# Patient Record
Sex: MALE | Race: OTHER | NOT HISPANIC OR LATINO | URBAN - METROPOLITAN AREA
[De-identification: names, ages, dates, MRNs, and addresses within clinical notes are randomized per-mention and may not be internally consistent; named-entity substitution may affect disease eponyms.]

---

## 2021-01-01 ENCOUNTER — INPATIENT (INPATIENT)
Facility: HOSPITAL | Age: 81
LOS: 6 days | DRG: 308 | End: 2021-02-05
Attending: STUDENT IN AN ORGANIZED HEALTH CARE EDUCATION/TRAINING PROGRAM | Admitting: STUDENT IN AN ORGANIZED HEALTH CARE EDUCATION/TRAINING PROGRAM
Payer: MEDICARE

## 2021-01-01 VITALS
SYSTOLIC BLOOD PRESSURE: 91 MMHG | OXYGEN SATURATION: 98 % | WEIGHT: 175.05 LBS | TEMPERATURE: 98 F | RESPIRATION RATE: 20 BRPM | DIASTOLIC BLOOD PRESSURE: 58 MMHG | HEART RATE: 124 BPM

## 2021-01-01 VITALS
HEART RATE: 62 BPM | OXYGEN SATURATION: 95 % | RESPIRATION RATE: 20 BRPM | TEMPERATURE: 98 F | DIASTOLIC BLOOD PRESSURE: 73 MMHG | SYSTOLIC BLOOD PRESSURE: 126 MMHG

## 2021-01-01 DIAGNOSIS — I48.91 UNSPECIFIED ATRIAL FIBRILLATION: ICD-10-CM

## 2021-01-01 DIAGNOSIS — F03.90 UNSPECIFIED DEMENTIA WITHOUT BEHAVIORAL DISTURBANCE: ICD-10-CM

## 2021-01-01 DIAGNOSIS — R50.9 FEVER, UNSPECIFIED: ICD-10-CM

## 2021-01-01 DIAGNOSIS — Z29.9 ENCOUNTER FOR PROPHYLACTIC MEASURES, UNSPECIFIED: ICD-10-CM

## 2021-01-01 DIAGNOSIS — R53.81 OTHER MALAISE: ICD-10-CM

## 2021-01-01 DIAGNOSIS — Z51.5 ENCOUNTER FOR PALLIATIVE CARE: ICD-10-CM

## 2021-01-01 DIAGNOSIS — Z71.89 OTHER SPECIFIED COUNSELING: ICD-10-CM

## 2021-01-01 DIAGNOSIS — N17.9 ACUTE KIDNEY FAILURE, UNSPECIFIED: ICD-10-CM

## 2021-01-01 DIAGNOSIS — I95.89 OTHER HYPOTENSION: ICD-10-CM

## 2021-01-01 DIAGNOSIS — U07.1 COVID-19: ICD-10-CM

## 2021-01-01 DIAGNOSIS — D64.9 ANEMIA, UNSPECIFIED: ICD-10-CM

## 2021-01-01 DIAGNOSIS — R41.0 DISORIENTATION, UNSPECIFIED: ICD-10-CM

## 2021-01-01 DIAGNOSIS — E43 UNSPECIFIED SEVERE PROTEIN-CALORIE MALNUTRITION: ICD-10-CM

## 2021-01-01 DIAGNOSIS — C67.9 MALIGNANT NEOPLASM OF BLADDER, UNSPECIFIED: ICD-10-CM

## 2021-01-01 LAB
-  AMPICILLIN: SIGNIFICANT CHANGE UP
-  CIPROFLOXACIN: SIGNIFICANT CHANGE UP
-  DAPTOMYCIN: SIGNIFICANT CHANGE UP
-  LEVOFLOXACIN: SIGNIFICANT CHANGE UP
-  LINEZOLID: SIGNIFICANT CHANGE UP
-  NITROFURANTOIN: SIGNIFICANT CHANGE UP
-  TETRACYCLINE: SIGNIFICANT CHANGE UP
-  VANCOMYCIN: SIGNIFICANT CHANGE UP
ALBUMIN SERPL ELPH-MCNC: 1.7 G/DL — LOW (ref 3.5–5)
ALBUMIN SERPL ELPH-MCNC: 1.7 G/DL — LOW (ref 3.5–5)
ALBUMIN SERPL ELPH-MCNC: 1.8 G/DL — LOW (ref 3.5–5)
ALBUMIN SERPL ELPH-MCNC: 1.9 G/DL — LOW (ref 3.5–5)
ALBUMIN SERPL ELPH-MCNC: 1.9 G/DL — LOW (ref 3.5–5)
ALBUMIN SERPL ELPH-MCNC: 2 G/DL — LOW (ref 3.5–5)
ALBUMIN SERPL ELPH-MCNC: 2.1 G/DL — LOW (ref 3.5–5)
ALBUMIN SERPL ELPH-MCNC: 2.2 G/DL — LOW (ref 3.5–5)
ALP SERPL-CCNC: 102 U/L — SIGNIFICANT CHANGE UP (ref 40–120)
ALP SERPL-CCNC: 106 U/L — SIGNIFICANT CHANGE UP (ref 40–120)
ALP SERPL-CCNC: 108 U/L — SIGNIFICANT CHANGE UP (ref 40–120)
ALP SERPL-CCNC: 113 U/L — SIGNIFICANT CHANGE UP (ref 40–120)
ALP SERPL-CCNC: 115 U/L — SIGNIFICANT CHANGE UP (ref 40–120)
ALP SERPL-CCNC: 90 U/L — SIGNIFICANT CHANGE UP (ref 40–120)
ALP SERPL-CCNC: 94 U/L — SIGNIFICANT CHANGE UP (ref 40–120)
ALP SERPL-CCNC: 97 U/L — SIGNIFICANT CHANGE UP (ref 40–120)
ALT FLD-CCNC: 11 U/L DA — SIGNIFICANT CHANGE UP (ref 10–60)
ALT FLD-CCNC: 12 U/L DA — SIGNIFICANT CHANGE UP (ref 10–60)
ALT FLD-CCNC: 14 U/L DA — SIGNIFICANT CHANGE UP (ref 10–60)
ALT FLD-CCNC: 16 U/L DA — SIGNIFICANT CHANGE UP (ref 10–60)
ALT FLD-CCNC: 17 U/L DA — SIGNIFICANT CHANGE UP (ref 10–60)
ALT FLD-CCNC: 20 U/L DA — SIGNIFICANT CHANGE UP (ref 10–60)
ALT FLD-CCNC: 22 U/L DA — SIGNIFICANT CHANGE UP (ref 10–60)
ALT FLD-CCNC: 23 U/L DA — SIGNIFICANT CHANGE UP (ref 10–60)
ANION GAP SERPL CALC-SCNC: 10 MMOL/L — SIGNIFICANT CHANGE UP (ref 5–17)
ANION GAP SERPL CALC-SCNC: 5 MMOL/L — SIGNIFICANT CHANGE UP (ref 5–17)
ANION GAP SERPL CALC-SCNC: 5 MMOL/L — SIGNIFICANT CHANGE UP (ref 5–17)
ANION GAP SERPL CALC-SCNC: 6 MMOL/L — SIGNIFICANT CHANGE UP (ref 5–17)
ANION GAP SERPL CALC-SCNC: 7 MMOL/L — SIGNIFICANT CHANGE UP (ref 5–17)
ANION GAP SERPL CALC-SCNC: 7 MMOL/L — SIGNIFICANT CHANGE UP (ref 5–17)
ANION GAP SERPL CALC-SCNC: 8 MMOL/L — SIGNIFICANT CHANGE UP (ref 5–17)
ANION GAP SERPL CALC-SCNC: 8 MMOL/L — SIGNIFICANT CHANGE UP (ref 5–17)
ANISOCYTOSIS BLD QL: SIGNIFICANT CHANGE UP
APPEARANCE UR: ABNORMAL
APTT BLD: 29 SEC — SIGNIFICANT CHANGE UP (ref 27.5–35.5)
APTT BLD: 30.7 SEC — SIGNIFICANT CHANGE UP (ref 27.5–35.5)
AST SERPL-CCNC: 12 U/L — SIGNIFICANT CHANGE UP (ref 10–40)
AST SERPL-CCNC: 13 U/L — SIGNIFICANT CHANGE UP (ref 10–40)
AST SERPL-CCNC: 14 U/L — SIGNIFICANT CHANGE UP (ref 10–40)
AST SERPL-CCNC: 15 U/L — SIGNIFICANT CHANGE UP (ref 10–40)
AST SERPL-CCNC: 15 U/L — SIGNIFICANT CHANGE UP (ref 10–40)
AST SERPL-CCNC: 21 U/L — SIGNIFICANT CHANGE UP (ref 10–40)
AST SERPL-CCNC: 22 U/L — SIGNIFICANT CHANGE UP (ref 10–40)
AST SERPL-CCNC: 23 U/L — SIGNIFICANT CHANGE UP (ref 10–40)
BASOPHILS # BLD AUTO: 0 K/UL — SIGNIFICANT CHANGE UP (ref 0–0.2)
BASOPHILS # BLD AUTO: 0.02 K/UL — SIGNIFICANT CHANGE UP (ref 0–0.2)
BASOPHILS # BLD AUTO: 0.02 K/UL — SIGNIFICANT CHANGE UP (ref 0–0.2)
BASOPHILS # BLD AUTO: 0.03 K/UL — SIGNIFICANT CHANGE UP (ref 0–0.2)
BASOPHILS NFR BLD AUTO: 0 % — SIGNIFICANT CHANGE UP (ref 0–2)
BASOPHILS NFR BLD AUTO: 0.2 % — SIGNIFICANT CHANGE UP (ref 0–2)
BASOPHILS NFR BLD AUTO: 0.3 % — SIGNIFICANT CHANGE UP (ref 0–2)
BASOPHILS NFR BLD AUTO: 0.3 % — SIGNIFICANT CHANGE UP (ref 0–2)
BILIRUB SERPL-MCNC: 0.4 MG/DL — SIGNIFICANT CHANGE UP (ref 0.2–1.2)
BILIRUB SERPL-MCNC: 0.5 MG/DL — SIGNIFICANT CHANGE UP (ref 0.2–1.2)
BILIRUB UR-MCNC: NEGATIVE — SIGNIFICANT CHANGE UP
BLD GP AB SCN SERPL QL: SIGNIFICANT CHANGE UP
BUN SERPL-MCNC: 24 MG/DL — HIGH (ref 7–18)
BUN SERPL-MCNC: 25 MG/DL — HIGH (ref 7–18)
BUN SERPL-MCNC: 28 MG/DL — HIGH (ref 7–18)
BUN SERPL-MCNC: 30 MG/DL — HIGH (ref 7–18)
BUN SERPL-MCNC: 31 MG/DL — HIGH (ref 7–18)
BUN SERPL-MCNC: 32 MG/DL — HIGH (ref 7–18)
BUN SERPL-MCNC: 32 MG/DL — HIGH (ref 7–18)
BUN SERPL-MCNC: 36 MG/DL — HIGH (ref 7–18)
CALCIUM SERPL-MCNC: 8.1 MG/DL — LOW (ref 8.4–10.5)
CALCIUM SERPL-MCNC: 8.3 MG/DL — LOW (ref 8.4–10.5)
CALCIUM SERPL-MCNC: 8.6 MG/DL — SIGNIFICANT CHANGE UP (ref 8.4–10.5)
CALCIUM SERPL-MCNC: 8.8 MG/DL — SIGNIFICANT CHANGE UP (ref 8.4–10.5)
CALCIUM SERPL-MCNC: 8.9 MG/DL — SIGNIFICANT CHANGE UP (ref 8.4–10.5)
CALCIUM SERPL-MCNC: 9.1 MG/DL — SIGNIFICANT CHANGE UP (ref 8.4–10.5)
CHLORIDE SERPL-SCNC: 103 MMOL/L — SIGNIFICANT CHANGE UP (ref 96–108)
CHLORIDE SERPL-SCNC: 103 MMOL/L — SIGNIFICANT CHANGE UP (ref 96–108)
CHLORIDE SERPL-SCNC: 104 MMOL/L — SIGNIFICANT CHANGE UP (ref 96–108)
CHLORIDE SERPL-SCNC: 106 MMOL/L — SIGNIFICANT CHANGE UP (ref 96–108)
CHLORIDE SERPL-SCNC: 107 MMOL/L — SIGNIFICANT CHANGE UP (ref 96–108)
CHLORIDE SERPL-SCNC: 109 MMOL/L — HIGH (ref 96–108)
CO2 SERPL-SCNC: 23 MMOL/L — SIGNIFICANT CHANGE UP (ref 22–31)
CO2 SERPL-SCNC: 26 MMOL/L — SIGNIFICANT CHANGE UP (ref 22–31)
CO2 SERPL-SCNC: 27 MMOL/L — SIGNIFICANT CHANGE UP (ref 22–31)
CO2 SERPL-SCNC: 28 MMOL/L — SIGNIFICANT CHANGE UP (ref 22–31)
CO2 SERPL-SCNC: 28 MMOL/L — SIGNIFICANT CHANGE UP (ref 22–31)
CO2 SERPL-SCNC: 29 MMOL/L — SIGNIFICANT CHANGE UP (ref 22–31)
COLOR SPEC: YELLOW — SIGNIFICANT CHANGE UP
CREAT SERPL-MCNC: 0.95 MG/DL — SIGNIFICANT CHANGE UP (ref 0.5–1.3)
CREAT SERPL-MCNC: 0.96 MG/DL — SIGNIFICANT CHANGE UP (ref 0.5–1.3)
CREAT SERPL-MCNC: 0.99 MG/DL — SIGNIFICANT CHANGE UP (ref 0.5–1.3)
CREAT SERPL-MCNC: 1.18 MG/DL — SIGNIFICANT CHANGE UP (ref 0.5–1.3)
CREAT SERPL-MCNC: 1.2 MG/DL — SIGNIFICANT CHANGE UP (ref 0.5–1.3)
CREAT SERPL-MCNC: 1.36 MG/DL — HIGH (ref 0.5–1.3)
CREAT SERPL-MCNC: 1.38 MG/DL — HIGH (ref 0.5–1.3)
CREAT SERPL-MCNC: 1.43 MG/DL — HIGH (ref 0.5–1.3)
CULTURE RESULTS: SIGNIFICANT CHANGE UP
D DIMER BLD IA.RAPID-MCNC: 497 NG/ML DDU — HIGH
DIFF PNL FLD: ABNORMAL
EOSINOPHIL # BLD AUTO: 0.04 K/UL — SIGNIFICANT CHANGE UP (ref 0–0.5)
EOSINOPHIL # BLD AUTO: 0.16 K/UL — SIGNIFICANT CHANGE UP (ref 0–0.5)
EOSINOPHIL # BLD AUTO: 0.16 K/UL — SIGNIFICANT CHANGE UP (ref 0–0.5)
EOSINOPHIL # BLD AUTO: 0.17 K/UL — SIGNIFICANT CHANGE UP (ref 0–0.5)
EOSINOPHIL NFR BLD AUTO: 0.4 % — SIGNIFICANT CHANGE UP (ref 0–6)
EOSINOPHIL NFR BLD AUTO: 1.9 % — SIGNIFICANT CHANGE UP (ref 0–6)
EOSINOPHIL NFR BLD AUTO: 2 % — SIGNIFICANT CHANGE UP (ref 0–6)
EOSINOPHIL NFR BLD AUTO: 2.3 % — SIGNIFICANT CHANGE UP (ref 0–6)
FERRITIN SERPL-MCNC: 269 NG/ML — SIGNIFICANT CHANGE UP (ref 30–400)
FIBRINOGEN PPP-MCNC: 980 MG/DL — HIGH (ref 290–520)
GLUCOSE BLDC GLUCOMTR-MCNC: 165 MG/DL — HIGH (ref 70–99)
GLUCOSE BLDC GLUCOMTR-MCNC: 178 MG/DL — HIGH (ref 70–99)
GLUCOSE SERPL-MCNC: 100 MG/DL — HIGH (ref 70–99)
GLUCOSE SERPL-MCNC: 111 MG/DL — HIGH (ref 70–99)
GLUCOSE SERPL-MCNC: 116 MG/DL — HIGH (ref 70–99)
GLUCOSE SERPL-MCNC: 120 MG/DL — HIGH (ref 70–99)
GLUCOSE SERPL-MCNC: 131 MG/DL — HIGH (ref 70–99)
GLUCOSE SERPL-MCNC: 77 MG/DL — SIGNIFICANT CHANGE UP (ref 70–99)
GLUCOSE SERPL-MCNC: 90 MG/DL — SIGNIFICANT CHANGE UP (ref 70–99)
GLUCOSE SERPL-MCNC: 93 MG/DL — SIGNIFICANT CHANGE UP (ref 70–99)
GLUCOSE UR QL: NEGATIVE — SIGNIFICANT CHANGE UP
HCT VFR BLD CALC: 24.9 % — LOW (ref 39–50)
HCT VFR BLD CALC: 25.2 % — LOW (ref 39–50)
HCT VFR BLD CALC: 25.6 % — LOW (ref 39–50)
HCT VFR BLD CALC: 25.8 % — LOW (ref 39–50)
HCT VFR BLD CALC: 26.9 % — LOW (ref 39–50)
HCT VFR BLD CALC: 27.2 % — LOW (ref 39–50)
HCT VFR BLD CALC: 27.7 % — LOW (ref 39–50)
HCT VFR BLD CALC: 28.7 % — LOW (ref 39–50)
HCT VFR BLD CALC: 29.7 % — LOW (ref 39–50)
HGB BLD-MCNC: 7.2 G/DL — LOW (ref 13–17)
HGB BLD-MCNC: 7.3 G/DL — LOW (ref 13–17)
HGB BLD-MCNC: 7.3 G/DL — LOW (ref 13–17)
HGB BLD-MCNC: 7.6 G/DL — LOW (ref 13–17)
HGB BLD-MCNC: 7.7 G/DL — LOW (ref 13–17)
HGB BLD-MCNC: 8 G/DL — LOW (ref 13–17)
HGB BLD-MCNC: 8.2 G/DL — LOW (ref 13–17)
HGB BLD-MCNC: 8.4 G/DL — LOW (ref 13–17)
HGB BLD-MCNC: 8.6 G/DL — LOW (ref 13–17)
HYPOCHROMIA BLD QL: SLIGHT — SIGNIFICANT CHANGE UP
IMM GRANULOCYTES NFR BLD AUTO: 0.7 % — SIGNIFICANT CHANGE UP (ref 0–1.5)
IMM GRANULOCYTES NFR BLD AUTO: 0.8 % — SIGNIFICANT CHANGE UP (ref 0–1.5)
IMM GRANULOCYTES NFR BLD AUTO: 1.3 % — SIGNIFICANT CHANGE UP (ref 0–1.5)
INR BLD: 1.31 RATIO — HIGH (ref 0.88–1.16)
INR BLD: 1.33 RATIO — HIGH (ref 0.88–1.16)
KETONES UR-MCNC: ABNORMAL
LACTATE SERPL-SCNC: 1.5 MMOL/L — SIGNIFICANT CHANGE UP (ref 0.7–2)
LACTATE SERPL-SCNC: 1.8 MMOL/L — SIGNIFICANT CHANGE UP (ref 0.7–2)
LDH SERPL L TO P-CCNC: 202 U/L — SIGNIFICANT CHANGE UP (ref 120–225)
LEUKOCYTE ESTERASE UR-ACNC: ABNORMAL
LYMPHOCYTES # BLD AUTO: 0.57 K/UL — LOW (ref 1–3.3)
LYMPHOCYTES # BLD AUTO: 0.68 K/UL — LOW (ref 1–3.3)
LYMPHOCYTES # BLD AUTO: 0.74 K/UL — LOW (ref 1–3.3)
LYMPHOCYTES # BLD AUTO: 0.75 K/UL — LOW (ref 1–3.3)
LYMPHOCYTES # BLD AUTO: 10.8 % — LOW (ref 13–44)
LYMPHOCYTES # BLD AUTO: 6.8 % — LOW (ref 13–44)
LYMPHOCYTES # BLD AUTO: 7 % — LOW (ref 13–44)
LYMPHOCYTES # BLD AUTO: 8.2 % — LOW (ref 13–44)
MAGNESIUM SERPL-MCNC: 1.9 MG/DL — SIGNIFICANT CHANGE UP (ref 1.6–2.6)
MAGNESIUM SERPL-MCNC: 2 MG/DL — SIGNIFICANT CHANGE UP (ref 1.6–2.6)
MAGNESIUM SERPL-MCNC: 2.1 MG/DL — SIGNIFICANT CHANGE UP (ref 1.6–2.6)
MAGNESIUM SERPL-MCNC: 2.2 MG/DL — SIGNIFICANT CHANGE UP (ref 1.6–2.6)
MAGNESIUM SERPL-MCNC: 2.2 MG/DL — SIGNIFICANT CHANGE UP (ref 1.6–2.6)
MANUAL SMEAR VERIFICATION: SIGNIFICANT CHANGE UP
MCHC RBC-ENTMCNC: 26.2 PG — LOW (ref 27–34)
MCHC RBC-ENTMCNC: 26.5 PG — LOW (ref 27–34)
MCHC RBC-ENTMCNC: 26.6 PG — LOW (ref 27–34)
MCHC RBC-ENTMCNC: 26.7 PG — LOW (ref 27–34)
MCHC RBC-ENTMCNC: 26.8 PG — LOW (ref 27–34)
MCHC RBC-ENTMCNC: 26.9 PG — LOW (ref 27–34)
MCHC RBC-ENTMCNC: 27 PG — SIGNIFICANT CHANGE UP (ref 27–34)
MCHC RBC-ENTMCNC: 27.4 PG — SIGNIFICANT CHANGE UP (ref 27–34)
MCHC RBC-ENTMCNC: 27.5 PG — SIGNIFICANT CHANGE UP (ref 27–34)
MCHC RBC-ENTMCNC: 28.3 GM/DL — LOW (ref 32–36)
MCHC RBC-ENTMCNC: 28.5 GM/DL — LOW (ref 32–36)
MCHC RBC-ENTMCNC: 28.9 GM/DL — LOW (ref 32–36)
MCHC RBC-ENTMCNC: 29 GM/DL — LOW (ref 32–36)
MCHC RBC-ENTMCNC: 29 GM/DL — LOW (ref 32–36)
MCHC RBC-ENTMCNC: 29.3 GM/DL — LOW (ref 32–36)
MCHC RBC-ENTMCNC: 29.4 GM/DL — LOW (ref 32–36)
MCHC RBC-ENTMCNC: 29.6 GM/DL — LOW (ref 32–36)
MCHC RBC-ENTMCNC: 29.8 GM/DL — LOW (ref 32–36)
MCV RBC AUTO: 91.3 FL — SIGNIFICANT CHANGE UP (ref 80–100)
MCV RBC AUTO: 91.7 FL — SIGNIFICANT CHANGE UP (ref 80–100)
MCV RBC AUTO: 91.8 FL — SIGNIFICANT CHANGE UP (ref 80–100)
MCV RBC AUTO: 92.2 FL — SIGNIFICANT CHANGE UP (ref 80–100)
MCV RBC AUTO: 92.3 FL — SIGNIFICANT CHANGE UP (ref 80–100)
MCV RBC AUTO: 92.8 FL — SIGNIFICANT CHANGE UP (ref 80–100)
MCV RBC AUTO: 93 FL — SIGNIFICANT CHANGE UP (ref 80–100)
MCV RBC AUTO: 93 FL — SIGNIFICANT CHANGE UP (ref 80–100)
MCV RBC AUTO: 93.4 FL — SIGNIFICANT CHANGE UP (ref 80–100)
METHOD TYPE: SIGNIFICANT CHANGE UP
MICROCYTES BLD QL: SLIGHT — SIGNIFICANT CHANGE UP
MONOCYTES # BLD AUTO: 0.66 K/UL — SIGNIFICANT CHANGE UP (ref 0–0.9)
MONOCYTES # BLD AUTO: 0.73 K/UL — SIGNIFICANT CHANGE UP (ref 0–0.9)
MONOCYTES # BLD AUTO: 0.75 K/UL — SIGNIFICANT CHANGE UP (ref 0–0.9)
MONOCYTES # BLD AUTO: 0.8 K/UL — SIGNIFICANT CHANGE UP (ref 0–0.9)
MONOCYTES NFR BLD AUTO: 7.9 % — SIGNIFICANT CHANGE UP (ref 2–14)
MONOCYTES NFR BLD AUTO: 8.3 % — SIGNIFICANT CHANGE UP (ref 2–14)
MONOCYTES NFR BLD AUTO: 9 % — SIGNIFICANT CHANGE UP (ref 2–14)
MONOCYTES NFR BLD AUTO: 9.5 % — SIGNIFICANT CHANGE UP (ref 2–14)
NEUTROPHILS # BLD AUTO: 5.32 K/UL — SIGNIFICANT CHANGE UP (ref 1.8–7.4)
NEUTROPHILS # BLD AUTO: 6.67 K/UL — SIGNIFICANT CHANGE UP (ref 1.8–7.4)
NEUTROPHILS # BLD AUTO: 7.28 K/UL — SIGNIFICANT CHANGE UP (ref 1.8–7.4)
NEUTROPHILS # BLD AUTO: 8.39 K/UL — HIGH (ref 1.8–7.4)
NEUTROPHILS NFR BLD AUTO: 76.4 % — SIGNIFICANT CHANGE UP (ref 43–77)
NEUTROPHILS NFR BLD AUTO: 80.6 % — HIGH (ref 43–77)
NEUTROPHILS NFR BLD AUTO: 82 % — HIGH (ref 43–77)
NEUTROPHILS NFR BLD AUTO: 83.3 % — HIGH (ref 43–77)
NITRITE UR-MCNC: NEGATIVE — SIGNIFICANT CHANGE UP
NRBC # BLD: 0 /100 WBCS — SIGNIFICANT CHANGE UP (ref 0–0)
NRBC # BLD: 0 /100 — SIGNIFICANT CHANGE UP (ref 0–0)
ORGANISM # SPEC MICROSCOPIC CNT: SIGNIFICANT CHANGE UP
ORGANISM # SPEC MICROSCOPIC CNT: SIGNIFICANT CHANGE UP
PH UR: 5 — SIGNIFICANT CHANGE UP (ref 5–8)
PHOSPHATE SERPL-MCNC: 2.7 MG/DL — SIGNIFICANT CHANGE UP (ref 2.5–4.5)
PHOSPHATE SERPL-MCNC: 3.2 MG/DL — SIGNIFICANT CHANGE UP (ref 2.5–4.5)
PHOSPHATE SERPL-MCNC: 3.2 MG/DL — SIGNIFICANT CHANGE UP (ref 2.5–4.5)
PHOSPHATE SERPL-MCNC: 3.5 MG/DL — SIGNIFICANT CHANGE UP (ref 2.5–4.5)
PHOSPHATE SERPL-MCNC: 4.1 MG/DL — SIGNIFICANT CHANGE UP (ref 2.5–4.5)
PLAT MORPH BLD: NORMAL — SIGNIFICANT CHANGE UP
PLATELET # BLD AUTO: 211 K/UL — SIGNIFICANT CHANGE UP (ref 150–400)
PLATELET # BLD AUTO: 218 K/UL — SIGNIFICANT CHANGE UP (ref 150–400)
PLATELET # BLD AUTO: 218 K/UL — SIGNIFICANT CHANGE UP (ref 150–400)
PLATELET # BLD AUTO: 219 K/UL — SIGNIFICANT CHANGE UP (ref 150–400)
PLATELET # BLD AUTO: 220 K/UL — SIGNIFICANT CHANGE UP (ref 150–400)
PLATELET # BLD AUTO: 222 K/UL — SIGNIFICANT CHANGE UP (ref 150–400)
PLATELET # BLD AUTO: 242 K/UL — SIGNIFICANT CHANGE UP (ref 150–400)
PLATELET # BLD AUTO: 249 K/UL — SIGNIFICANT CHANGE UP (ref 150–400)
PLATELET # BLD AUTO: 250 K/UL — SIGNIFICANT CHANGE UP (ref 150–400)
PLATELET COUNT - ESTIMATE: NORMAL — SIGNIFICANT CHANGE UP
POIKILOCYTOSIS BLD QL AUTO: SLIGHT — SIGNIFICANT CHANGE UP
POLYCHROMASIA BLD QL SMEAR: SLIGHT — SIGNIFICANT CHANGE UP
POTASSIUM SERPL-MCNC: 3.8 MMOL/L — SIGNIFICANT CHANGE UP (ref 3.5–5.3)
POTASSIUM SERPL-MCNC: 3.9 MMOL/L — SIGNIFICANT CHANGE UP (ref 3.5–5.3)
POTASSIUM SERPL-MCNC: 4 MMOL/L — SIGNIFICANT CHANGE UP (ref 3.5–5.3)
POTASSIUM SERPL-MCNC: 4.2 MMOL/L — SIGNIFICANT CHANGE UP (ref 3.5–5.3)
POTASSIUM SERPL-MCNC: 4.2 MMOL/L — SIGNIFICANT CHANGE UP (ref 3.5–5.3)
POTASSIUM SERPL-MCNC: 4.5 MMOL/L — SIGNIFICANT CHANGE UP (ref 3.5–5.3)
POTASSIUM SERPL-SCNC: 3.8 MMOL/L — SIGNIFICANT CHANGE UP (ref 3.5–5.3)
POTASSIUM SERPL-SCNC: 3.9 MMOL/L — SIGNIFICANT CHANGE UP (ref 3.5–5.3)
POTASSIUM SERPL-SCNC: 4 MMOL/L — SIGNIFICANT CHANGE UP (ref 3.5–5.3)
POTASSIUM SERPL-SCNC: 4.2 MMOL/L — SIGNIFICANT CHANGE UP (ref 3.5–5.3)
POTASSIUM SERPL-SCNC: 4.2 MMOL/L — SIGNIFICANT CHANGE UP (ref 3.5–5.3)
POTASSIUM SERPL-SCNC: 4.5 MMOL/L — SIGNIFICANT CHANGE UP (ref 3.5–5.3)
PROCALCITONIN SERPL-MCNC: 0.81 NG/ML — HIGH (ref 0.02–0.1)
PROT SERPL-MCNC: 6.1 G/DL — SIGNIFICANT CHANGE UP (ref 6–8.3)
PROT SERPL-MCNC: 6.2 G/DL — SIGNIFICANT CHANGE UP (ref 6–8.3)
PROT SERPL-MCNC: 6.4 G/DL — SIGNIFICANT CHANGE UP (ref 6–8.3)
PROT SERPL-MCNC: 6.7 G/DL — SIGNIFICANT CHANGE UP (ref 6–8.3)
PROT SERPL-MCNC: 6.7 G/DL — SIGNIFICANT CHANGE UP (ref 6–8.3)
PROT SERPL-MCNC: 7 G/DL — SIGNIFICANT CHANGE UP (ref 6–8.3)
PROT UR-MCNC: 100
PROTHROM AB SERPL-ACNC: 15.4 SEC — HIGH (ref 10.6–13.6)
PROTHROM AB SERPL-ACNC: 15.6 SEC — HIGH (ref 10.6–13.6)
RAPID RVP RESULT: DETECTED
RBC # BLD: 2.7 M/UL — LOW (ref 4.2–5.8)
RBC # BLD: 2.71 M/UL — LOW (ref 4.2–5.8)
RBC # BLD: 2.74 M/UL — LOW (ref 4.2–5.8)
RBC # BLD: 2.81 M/UL — LOW (ref 4.2–5.8)
RBC # BLD: 2.9 M/UL — LOW (ref 4.2–5.8)
RBC # BLD: 2.98 M/UL — LOW (ref 4.2–5.8)
RBC # BLD: 2.98 M/UL — LOW (ref 4.2–5.8)
RBC # BLD: 3.11 M/UL — LOW (ref 4.2–5.8)
RBC # BLD: 3.24 M/UL — LOW (ref 4.2–5.8)
RBC # FLD: 17.8 % — HIGH (ref 10.3–14.5)
RBC # FLD: 17.9 % — HIGH (ref 10.3–14.5)
RBC # FLD: 17.9 % — HIGH (ref 10.3–14.5)
RBC # FLD: 18.6 % — HIGH (ref 10.3–14.5)
RBC BLD AUTO: ABNORMAL
SARS-COV-2 RNA SPEC QL NAA+PROBE: DETECTED
SARS-COV-2 RNA SPEC QL NAA+PROBE: SIGNIFICANT CHANGE UP
SODIUM SERPL-SCNC: 136 MMOL/L — SIGNIFICANT CHANGE UP (ref 135–145)
SODIUM SERPL-SCNC: 137 MMOL/L — SIGNIFICANT CHANGE UP (ref 135–145)
SODIUM SERPL-SCNC: 139 MMOL/L — SIGNIFICANT CHANGE UP (ref 135–145)
SODIUM SERPL-SCNC: 140 MMOL/L — SIGNIFICANT CHANGE UP (ref 135–145)
SODIUM SERPL-SCNC: 140 MMOL/L — SIGNIFICANT CHANGE UP (ref 135–145)
SODIUM SERPL-SCNC: 142 MMOL/L — SIGNIFICANT CHANGE UP (ref 135–145)
SODIUM SERPL-SCNC: 142 MMOL/L — SIGNIFICANT CHANGE UP (ref 135–145)
SODIUM SERPL-SCNC: 143 MMOL/L — SIGNIFICANT CHANGE UP (ref 135–145)
SP GR SPEC: 1.02 — SIGNIFICANT CHANGE UP (ref 1.01–1.02)
SPECIMEN SOURCE: SIGNIFICANT CHANGE UP
TROPONIN I SERPL-MCNC: <0.015 NG/ML — SIGNIFICANT CHANGE UP (ref 0–0.04)
UROBILINOGEN FLD QL: NEGATIVE — SIGNIFICANT CHANGE UP
VANCOMYCIN TROUGH SERPL-MCNC: 24.8 UG/ML — HIGH (ref 10–20)
WBC # BLD: 10.07 K/UL — SIGNIFICANT CHANGE UP (ref 3.8–10.5)
WBC # BLD: 10.72 K/UL — HIGH (ref 3.8–10.5)
WBC # BLD: 6.22 K/UL — SIGNIFICANT CHANGE UP (ref 3.8–10.5)
WBC # BLD: 6.88 K/UL — SIGNIFICANT CHANGE UP (ref 3.8–10.5)
WBC # BLD: 6.96 K/UL — SIGNIFICANT CHANGE UP (ref 3.8–10.5)
WBC # BLD: 7.15 K/UL — SIGNIFICANT CHANGE UP (ref 3.8–10.5)
WBC # BLD: 7.39 K/UL — SIGNIFICANT CHANGE UP (ref 3.8–10.5)
WBC # BLD: 8.13 K/UL — SIGNIFICANT CHANGE UP (ref 3.8–10.5)
WBC # BLD: 9.03 K/UL — SIGNIFICANT CHANGE UP (ref 3.8–10.5)
WBC # FLD AUTO: 10.07 K/UL — SIGNIFICANT CHANGE UP (ref 3.8–10.5)
WBC # FLD AUTO: 10.72 K/UL — HIGH (ref 3.8–10.5)
WBC # FLD AUTO: 6.22 K/UL — SIGNIFICANT CHANGE UP (ref 3.8–10.5)
WBC # FLD AUTO: 6.88 K/UL — SIGNIFICANT CHANGE UP (ref 3.8–10.5)
WBC # FLD AUTO: 6.96 K/UL — SIGNIFICANT CHANGE UP (ref 3.8–10.5)
WBC # FLD AUTO: 7.15 K/UL — SIGNIFICANT CHANGE UP (ref 3.8–10.5)
WBC # FLD AUTO: 7.39 K/UL — SIGNIFICANT CHANGE UP (ref 3.8–10.5)
WBC # FLD AUTO: 8.13 K/UL — SIGNIFICANT CHANGE UP (ref 3.8–10.5)
WBC # FLD AUTO: 9.03 K/UL — SIGNIFICANT CHANGE UP (ref 3.8–10.5)

## 2021-01-01 PROCEDURE — 71045 X-RAY EXAM CHEST 1 VIEW: CPT | Mod: 26

## 2021-01-01 PROCEDURE — 99232 SBSQ HOSP IP/OBS MODERATE 35: CPT | Mod: CS,GC

## 2021-01-01 PROCEDURE — 99232 SBSQ HOSP IP/OBS MODERATE 35: CPT | Mod: GC

## 2021-01-01 PROCEDURE — 99223 1ST HOSP IP/OBS HIGH 75: CPT

## 2021-01-01 PROCEDURE — 99285 EMERGENCY DEPT VISIT HI MDM: CPT

## 2021-01-01 PROCEDURE — 99233 SBSQ HOSP IP/OBS HIGH 50: CPT | Mod: GC

## 2021-01-01 PROCEDURE — 99223 1ST HOSP IP/OBS HIGH 75: CPT | Mod: GC

## 2021-01-01 RX ORDER — MORPHINE SULFATE 50 MG/1
2 CAPSULE, EXTENDED RELEASE ORAL EVERY 4 HOURS
Refills: 0 | Status: DISCONTINUED | OUTPATIENT
Start: 2021-01-01 | End: 2021-01-01

## 2021-01-01 RX ORDER — METOPROLOL TARTRATE 50 MG
12.5 TABLET ORAL
Refills: 0 | Status: DISCONTINUED | OUTPATIENT
Start: 2021-01-01 | End: 2021-01-01

## 2021-01-01 RX ORDER — AMPICILLIN SODIUM AND SULBACTAM SODIUM 250; 125 MG/ML; MG/ML
1.5 INJECTION, POWDER, FOR SUSPENSION INTRAMUSCULAR; INTRAVENOUS EVERY 8 HOURS
Refills: 0 | Status: DISCONTINUED | OUTPATIENT
Start: 2021-01-01 | End: 2021-01-01

## 2021-01-01 RX ORDER — SODIUM CHLORIDE 9 MG/ML
1000 INJECTION, SOLUTION INTRAVENOUS
Refills: 0 | Status: DISCONTINUED | OUTPATIENT
Start: 2021-01-01 | End: 2021-01-01

## 2021-01-01 RX ORDER — AMIODARONE HYDROCHLORIDE 400 MG/1
400 TABLET ORAL EVERY 8 HOURS
Refills: 0 | Status: DISCONTINUED | OUTPATIENT
Start: 2021-01-01 | End: 2021-01-01

## 2021-01-01 RX ORDER — AMIODARONE HYDROCHLORIDE 400 MG/1
200 TABLET ORAL DAILY
Refills: 0 | Status: DISCONTINUED | OUTPATIENT
Start: 2021-02-06 | End: 2021-01-01

## 2021-01-01 RX ORDER — SODIUM CHLORIDE 9 MG/ML
1 INJECTION INTRAMUSCULAR; INTRAVENOUS; SUBCUTANEOUS DAILY
Refills: 0 | Status: DISCONTINUED | OUTPATIENT
Start: 2021-01-01 | End: 2021-01-01

## 2021-01-01 RX ORDER — AMIODARONE HYDROCHLORIDE 400 MG/1
TABLET ORAL
Refills: 0 | Status: DISCONTINUED | OUTPATIENT
Start: 2021-01-01 | End: 2021-01-01

## 2021-01-01 RX ORDER — SODIUM CHLORIDE 9 MG/ML
1000 INJECTION INTRAMUSCULAR; INTRAVENOUS; SUBCUTANEOUS
Refills: 0 | Status: DISCONTINUED | OUTPATIENT
Start: 2021-01-01 | End: 2021-01-01

## 2021-01-01 RX ORDER — METOPROLOL TARTRATE 50 MG
75 TABLET ORAL
Refills: 0 | Status: DISCONTINUED | OUTPATIENT
Start: 2021-01-01 | End: 2021-01-01

## 2021-01-01 RX ORDER — ENOXAPARIN SODIUM 100 MG/ML
40 INJECTION SUBCUTANEOUS DAILY
Refills: 0 | Status: DISCONTINUED | OUTPATIENT
Start: 2021-01-01 | End: 2021-01-01

## 2021-01-01 RX ORDER — VANCOMYCIN HCL 1 G
1000 VIAL (EA) INTRAVENOUS EVERY 12 HOURS
Refills: 0 | Status: DISCONTINUED | OUTPATIENT
Start: 2021-01-01 | End: 2021-01-01

## 2021-01-01 RX ORDER — ALBUTEROL 90 UG/1
2 AEROSOL, METERED ORAL EVERY 6 HOURS
Refills: 0 | Status: DISCONTINUED | OUTPATIENT
Start: 2021-01-01 | End: 2021-01-01

## 2021-01-01 RX ORDER — METOPROLOL TARTRATE 50 MG
50 TABLET ORAL
Refills: 0 | Status: DISCONTINUED | OUTPATIENT
Start: 2021-01-01 | End: 2021-01-01

## 2021-01-01 RX ORDER — ROBINUL 0.2 MG/ML
1 INJECTION INTRAMUSCULAR; INTRAVENOUS EVERY 6 HOURS
Refills: 0 | Status: DISCONTINUED | OUTPATIENT
Start: 2021-01-01 | End: 2021-01-01

## 2021-01-01 RX ORDER — PREGABALIN 225 MG/1
1000 CAPSULE ORAL DAILY
Refills: 0 | Status: DISCONTINUED | OUTPATIENT
Start: 2021-01-01 | End: 2021-01-01

## 2021-01-01 RX ORDER — PIPERACILLIN AND TAZOBACTAM 4; .5 G/20ML; G/20ML
3.38 INJECTION, POWDER, LYOPHILIZED, FOR SOLUTION INTRAVENOUS EVERY 8 HOURS
Refills: 0 | Status: DISCONTINUED | OUTPATIENT
Start: 2021-01-01 | End: 2021-01-01

## 2021-01-01 RX ORDER — SODIUM CHLORIDE 9 MG/ML
500 INJECTION INTRAMUSCULAR; INTRAVENOUS; SUBCUTANEOUS ONCE
Refills: 0 | Status: COMPLETED | OUTPATIENT
Start: 2021-01-01 | End: 2021-01-01

## 2021-01-01 RX ORDER — METOPROLOL TARTRATE 50 MG
25 TABLET ORAL
Refills: 0 | Status: DISCONTINUED | OUTPATIENT
Start: 2021-01-01 | End: 2021-01-01

## 2021-01-01 RX ORDER — ACETAMINOPHEN 500 MG
650 TABLET ORAL EVERY 6 HOURS
Refills: 0 | Status: DISCONTINUED | OUTPATIENT
Start: 2021-01-01 | End: 2021-01-01

## 2021-01-01 RX ORDER — VANCOMYCIN HCL 1 G
1000 VIAL (EA) INTRAVENOUS ONCE
Refills: 0 | Status: COMPLETED | OUTPATIENT
Start: 2021-01-01 | End: 2021-01-01

## 2021-01-01 RX ORDER — MORPHINE SULFATE 50 MG/1
2 CAPSULE, EXTENDED RELEASE ORAL EVERY 6 HOURS
Refills: 0 | Status: DISCONTINUED | OUTPATIENT
Start: 2021-01-01 | End: 2021-01-01

## 2021-01-01 RX ORDER — DEXAMETHASONE 0.5 MG/5ML
6 ELIXIR ORAL DAILY
Refills: 0 | Status: DISCONTINUED | OUTPATIENT
Start: 2021-01-01 | End: 2021-01-01

## 2021-01-01 RX ORDER — SODIUM CHLORIDE 9 MG/ML
1000 INJECTION INTRAMUSCULAR; INTRAVENOUS; SUBCUTANEOUS ONCE
Refills: 0 | Status: COMPLETED | OUTPATIENT
Start: 2021-01-01 | End: 2021-01-01

## 2021-01-01 RX ORDER — VANCOMYCIN HCL 1 G
VIAL (EA) INTRAVENOUS
Refills: 0 | Status: DISCONTINUED | OUTPATIENT
Start: 2021-01-01 | End: 2021-01-01

## 2021-01-01 RX ORDER — PIPERACILLIN AND TAZOBACTAM 4; .5 G/20ML; G/20ML
3.38 INJECTION, POWDER, LYOPHILIZED, FOR SOLUTION INTRAVENOUS ONCE
Refills: 0 | Status: COMPLETED | OUTPATIENT
Start: 2021-01-01 | End: 2021-01-01

## 2021-01-01 RX ORDER — METOPROLOL TARTRATE 50 MG
25 TABLET ORAL ONCE
Refills: 0 | Status: COMPLETED | OUTPATIENT
Start: 2021-01-01 | End: 2021-01-01

## 2021-01-01 RX ORDER — PREGABALIN 225 MG/1
1 CAPSULE ORAL
Qty: 0 | Refills: 0 | DISCHARGE

## 2021-01-01 RX ORDER — SODIUM CHLORIDE 9 MG/ML
1 INJECTION INTRAMUSCULAR; INTRAVENOUS; SUBCUTANEOUS
Qty: 0 | Refills: 0 | DISCHARGE

## 2021-01-01 RX ORDER — POLYETHYLENE GLYCOL 3350 17 G/17G
17 POWDER, FOR SOLUTION ORAL DAILY
Refills: 0 | Status: DISCONTINUED | OUTPATIENT
Start: 2021-01-01 | End: 2021-01-01

## 2021-01-01 RX ORDER — SENNA PLUS 8.6 MG/1
2 TABLET ORAL AT BEDTIME
Refills: 0 | Status: DISCONTINUED | OUTPATIENT
Start: 2021-01-01 | End: 2021-01-01

## 2021-01-01 RX ORDER — MORPHINE SULFATE 50 MG/1
2 CAPSULE, EXTENDED RELEASE ORAL ONCE
Refills: 0 | Status: DISCONTINUED | OUTPATIENT
Start: 2021-01-01 | End: 2021-01-01

## 2021-01-01 RX ORDER — ASPIRIN/CALCIUM CARB/MAGNESIUM 324 MG
81 TABLET ORAL DAILY
Refills: 0 | Status: DISCONTINUED | OUTPATIENT
Start: 2021-01-01 | End: 2021-01-01

## 2021-01-01 RX ADMIN — Medication 50 MILLIGRAM(S): at 17:47

## 2021-01-01 RX ADMIN — Medication 650 MILLIGRAM(S): at 22:36

## 2021-01-01 RX ADMIN — AMIODARONE HYDROCHLORIDE 400 MILLIGRAM(S): 400 TABLET ORAL at 13:07

## 2021-01-01 RX ADMIN — ENOXAPARIN SODIUM 40 MILLIGRAM(S): 100 INJECTION SUBCUTANEOUS at 12:22

## 2021-01-01 RX ADMIN — PIPERACILLIN AND TAZOBACTAM 25 GRAM(S): 4; .5 INJECTION, POWDER, LYOPHILIZED, FOR SOLUTION INTRAVENOUS at 05:59

## 2021-01-01 RX ADMIN — PREGABALIN 1000 MICROGRAM(S): 225 CAPSULE ORAL at 11:08

## 2021-01-01 RX ADMIN — PREGABALIN 1000 MICROGRAM(S): 225 CAPSULE ORAL at 11:38

## 2021-01-01 RX ADMIN — AMPICILLIN SODIUM AND SULBACTAM SODIUM 100 GRAM(S): 250; 125 INJECTION, POWDER, FOR SUSPENSION INTRAMUSCULAR; INTRAVENOUS at 20:18

## 2021-01-01 RX ADMIN — SODIUM CHLORIDE 1 GRAM(S): 9 INJECTION INTRAMUSCULAR; INTRAVENOUS; SUBCUTANEOUS at 12:18

## 2021-01-01 RX ADMIN — Medication 81 MILLIGRAM(S): at 12:22

## 2021-01-01 RX ADMIN — MORPHINE SULFATE 2 MILLIGRAM(S): 50 CAPSULE, EXTENDED RELEASE ORAL at 00:32

## 2021-01-01 RX ADMIN — SODIUM CHLORIDE 500 MILLILITER(S): 9 INJECTION INTRAMUSCULAR; INTRAVENOUS; SUBCUTANEOUS at 11:23

## 2021-01-01 RX ADMIN — Medication 12.5 MILLIGRAM(S): at 18:20

## 2021-01-01 RX ADMIN — Medication 50 MILLIGRAM(S): at 06:04

## 2021-01-01 RX ADMIN — Medication 50 MILLIGRAM(S): at 05:59

## 2021-01-01 RX ADMIN — Medication 250 MILLIGRAM(S): at 17:14

## 2021-01-01 RX ADMIN — AMPICILLIN SODIUM AND SULBACTAM SODIUM 100 GRAM(S): 250; 125 INJECTION, POWDER, FOR SUSPENSION INTRAMUSCULAR; INTRAVENOUS at 14:26

## 2021-01-01 RX ADMIN — AMPICILLIN SODIUM AND SULBACTAM SODIUM 100 GRAM(S): 250; 125 INJECTION, POWDER, FOR SUSPENSION INTRAMUSCULAR; INTRAVENOUS at 21:27

## 2021-01-01 RX ADMIN — ENOXAPARIN SODIUM 40 MILLIGRAM(S): 100 INJECTION SUBCUTANEOUS at 12:18

## 2021-01-01 RX ADMIN — SODIUM CHLORIDE 1 GRAM(S): 9 INJECTION INTRAMUSCULAR; INTRAVENOUS; SUBCUTANEOUS at 12:22

## 2021-01-01 RX ADMIN — ENOXAPARIN SODIUM 40 MILLIGRAM(S): 100 INJECTION SUBCUTANEOUS at 11:08

## 2021-01-01 RX ADMIN — AMIODARONE HYDROCHLORIDE 400 MILLIGRAM(S): 400 TABLET ORAL at 06:00

## 2021-01-01 RX ADMIN — MORPHINE SULFATE 2 MILLIGRAM(S): 50 CAPSULE, EXTENDED RELEASE ORAL at 20:19

## 2021-01-01 RX ADMIN — PREGABALIN 1000 MICROGRAM(S): 225 CAPSULE ORAL at 11:23

## 2021-01-01 RX ADMIN — AMIODARONE HYDROCHLORIDE 400 MILLIGRAM(S): 400 TABLET ORAL at 06:04

## 2021-01-01 RX ADMIN — Medication 25 MILLIGRAM(S): at 05:23

## 2021-01-01 RX ADMIN — SODIUM CHLORIDE 1000 MILLILITER(S): 9 INJECTION INTRAMUSCULAR; INTRAVENOUS; SUBCUTANEOUS at 09:15

## 2021-01-01 RX ADMIN — AMIODARONE HYDROCHLORIDE 400 MILLIGRAM(S): 400 TABLET ORAL at 14:26

## 2021-01-01 RX ADMIN — SODIUM CHLORIDE 500 MILLILITER(S): 9 INJECTION INTRAMUSCULAR; INTRAVENOUS; SUBCUTANEOUS at 09:48

## 2021-01-01 RX ADMIN — SODIUM CHLORIDE 1 GRAM(S): 9 INJECTION INTRAMUSCULAR; INTRAVENOUS; SUBCUTANEOUS at 11:00

## 2021-01-01 RX ADMIN — Medication 12.5 MILLIGRAM(S): at 05:55

## 2021-01-01 RX ADMIN — SODIUM CHLORIDE 1 GRAM(S): 9 INJECTION INTRAMUSCULAR; INTRAVENOUS; SUBCUTANEOUS at 11:08

## 2021-01-01 RX ADMIN — Medication 250 MILLIGRAM(S): at 12:04

## 2021-01-01 RX ADMIN — Medication 650 MILLIGRAM(S): at 11:22

## 2021-01-01 RX ADMIN — AMIODARONE HYDROCHLORIDE 400 MILLIGRAM(S): 400 TABLET ORAL at 21:42

## 2021-01-01 RX ADMIN — Medication 25 MILLIGRAM(S): at 11:00

## 2021-01-01 RX ADMIN — SODIUM CHLORIDE 1 GRAM(S): 9 INJECTION INTRAMUSCULAR; INTRAVENOUS; SUBCUTANEOUS at 11:44

## 2021-01-01 RX ADMIN — Medication 81 MILLIGRAM(S): at 11:38

## 2021-01-01 RX ADMIN — PIPERACILLIN AND TAZOBACTAM 25 GRAM(S): 4; .5 INJECTION, POWDER, LYOPHILIZED, FOR SOLUTION INTRAVENOUS at 21:22

## 2021-01-01 RX ADMIN — Medication 81 MILLIGRAM(S): at 11:44

## 2021-01-01 RX ADMIN — Medication 250 MILLIGRAM(S): at 05:59

## 2021-01-01 RX ADMIN — PREGABALIN 1000 MICROGRAM(S): 225 CAPSULE ORAL at 11:00

## 2021-01-01 RX ADMIN — ENOXAPARIN SODIUM 40 MILLIGRAM(S): 100 INJECTION SUBCUTANEOUS at 11:44

## 2021-01-01 RX ADMIN — PIPERACILLIN AND TAZOBACTAM 25 GRAM(S): 4; .5 INJECTION, POWDER, LYOPHILIZED, FOR SOLUTION INTRAVENOUS at 13:42

## 2021-01-01 RX ADMIN — SODIUM CHLORIDE 1000 MILLILITER(S): 9 INJECTION INTRAMUSCULAR; INTRAVENOUS; SUBCUTANEOUS at 16:19

## 2021-01-01 RX ADMIN — Medication 50 MILLIGRAM(S): at 17:44

## 2021-01-01 RX ADMIN — PIPERACILLIN AND TAZOBACTAM 200 GRAM(S): 4; .5 INJECTION, POWDER, LYOPHILIZED, FOR SOLUTION INTRAVENOUS at 13:04

## 2021-01-01 RX ADMIN — PIPERACILLIN AND TAZOBACTAM 25 GRAM(S): 4; .5 INJECTION, POWDER, LYOPHILIZED, FOR SOLUTION INTRAVENOUS at 12:21

## 2021-01-01 RX ADMIN — MORPHINE SULFATE 2 MILLIGRAM(S): 50 CAPSULE, EXTENDED RELEASE ORAL at 14:02

## 2021-01-01 RX ADMIN — Medication 81 MILLIGRAM(S): at 11:22

## 2021-01-01 RX ADMIN — AMIODARONE HYDROCHLORIDE 400 MILLIGRAM(S): 400 TABLET ORAL at 21:26

## 2021-01-01 RX ADMIN — SODIUM CHLORIDE 1000 MILLILITER(S): 9 INJECTION INTRAMUSCULAR; INTRAVENOUS; SUBCUTANEOUS at 11:44

## 2021-01-01 RX ADMIN — Medication 50 MILLIGRAM(S): at 17:53

## 2021-01-01 RX ADMIN — SODIUM CHLORIDE 50 MILLILITER(S): 9 INJECTION, SOLUTION INTRAVENOUS at 20:22

## 2021-01-01 RX ADMIN — PREGABALIN 1000 MICROGRAM(S): 225 CAPSULE ORAL at 12:22

## 2021-01-01 RX ADMIN — AMIODARONE HYDROCHLORIDE 400 MILLIGRAM(S): 400 TABLET ORAL at 05:59

## 2021-01-01 RX ADMIN — Medication 6 MILLIGRAM(S): at 06:04

## 2021-01-01 RX ADMIN — AMIODARONE HYDROCHLORIDE 400 MILLIGRAM(S): 400 TABLET ORAL at 21:22

## 2021-01-01 RX ADMIN — SODIUM CHLORIDE 1 GRAM(S): 9 INJECTION INTRAMUSCULAR; INTRAVENOUS; SUBCUTANEOUS at 11:38

## 2021-01-01 RX ADMIN — ROBINUL 1 MILLIGRAM(S): 0.2 INJECTION INTRAMUSCULAR; INTRAVENOUS at 14:25

## 2021-01-01 RX ADMIN — ENOXAPARIN SODIUM 40 MILLIGRAM(S): 100 INJECTION SUBCUTANEOUS at 11:22

## 2021-01-01 RX ADMIN — Medication 50 MILLIGRAM(S): at 17:14

## 2021-01-01 RX ADMIN — PREGABALIN 1000 MICROGRAM(S): 225 CAPSULE ORAL at 11:44

## 2021-01-01 RX ADMIN — Medication 650 MILLIGRAM(S): at 12:25

## 2021-01-01 RX ADMIN — AMIODARONE HYDROCHLORIDE 400 MILLIGRAM(S): 400 TABLET ORAL at 12:22

## 2021-01-01 RX ADMIN — PREGABALIN 1000 MICROGRAM(S): 225 CAPSULE ORAL at 12:18

## 2021-01-01 RX ADMIN — AMPICILLIN SODIUM AND SULBACTAM SODIUM 100 GRAM(S): 250; 125 INJECTION, POWDER, FOR SUSPENSION INTRAMUSCULAR; INTRAVENOUS at 06:04

## 2021-01-01 RX ADMIN — AMIODARONE HYDROCHLORIDE 400 MILLIGRAM(S): 400 TABLET ORAL at 14:02

## 2021-01-01 RX ADMIN — SODIUM CHLORIDE 1 GRAM(S): 9 INJECTION INTRAMUSCULAR; INTRAVENOUS; SUBCUTANEOUS at 11:23

## 2021-01-01 RX ADMIN — Medication 50 MILLIGRAM(S): at 05:44

## 2021-01-01 RX ADMIN — Medication 12.5 MILLIGRAM(S): at 06:43

## 2021-01-29 NOTE — H&P ADULT - PROBLEM SELECTOR PLAN 2
Pt has hx of off and on confusion  most likely 2/2 dementia  supportive measures for now Pt has hx of off and on confusion  most likely 2/2 dementia vs weakness from decreased oral intake  supportive measures for now

## 2021-01-29 NOTE — H&P ADULT - NSHPPHYSICALEXAM_GEN_ALL_CORE
PHYSICAL EXAM:  GENERAL: NAD, speaks in full sentences, no signs of respiratory distress  HEAD:  Atraumatic, Normocephalic  EYES: EOMI, PERRLA, conjunctiva and sclera clear  NECK: Supple, No JVD  CHEST/LUNG: No wheeze; No crackles; No accessory muscles used  HEART: s1,s2; No murmurs;   ABDOMEN: Soft, Nontender, Nondistended; Bowel sounds present; No guarding  EXTREMITIES:  2+ Peripheral Pulses, No cyanosis or edema  PSYCH: AAOx1-2  NEUROLOGY: no-focal defiict  SKIN: No rashes or lesions

## 2021-01-29 NOTE — H&P ADULT - ASSESSMENT
Patient is 80 year old man, from home lives with sree with h/o bladder cancer mets to lungs, sent in from oncologist Dr. Oviedo's office for hypotension. Pt presented to generalized weakness, with new onset A-fib diagnosed in ed.   D/w with Pt's daughter, Opal Abrams, [phone].  She confirms that Pt has been confused recently.  She says that he has been made comfort care, DNR, DNI, and she does want IVF (contrary to the MOLST form), antibioitc and all medical treatment and is ok with ct scans and mri. Treatment as normal pt but no aggresive measures likel blood thinners.  She wants to set up home hospice. Pt for home hospice.

## 2021-01-29 NOTE — H&P ADULT - PROBLEM SELECTOR PLAN 4
RISK                                                          Points  [] Previous VTE                                           3  [] Thrombophilia                                        2  [] Lower limb paralysis                              2   [] Current Cancer                                       2   [x] Immobilization > 24 hrs                        1  [] ICU/CCU stay > 24 hours                       1  [x] Age > 60                                                   1    scd boots and sc lovenox Pt p/w hb 8.6  mcv 91  f/u anemia panel  monitor cbc for now

## 2021-01-29 NOTE — ED PROVIDER NOTE - PROGRESS NOTE DETAILS
Pt's BP improved but still on low side, so will hold off on rate slowing medications for now and continue to monitor.  Informed Dr. Sherman, hospitalist, for admission and she accepts Pt.  I spoke with daughter, Opal, and she says that she would consent to potentially treating the HR with medications, but she does not want to give anticoagulation as Pt had complication of bleeding from the bladder when on anticoagulation previously.  Therefore, will admit to tele floor for monitoring.

## 2021-01-29 NOTE — ED PROVIDER NOTE - CARE PLAN
Principal Discharge DX:	New onset atrial fibrillation  Secondary Diagnosis:	Weakness generalized  Secondary Diagnosis:	Confusion

## 2021-01-29 NOTE — ED PROVIDER NOTE - CONSTITUTIONAL, MLM
normal... Well appearing, awake, alert, disoriented to person, place, time/situation and in no apparent distress, uncooperative

## 2021-01-29 NOTE — H&P ADULT - ATTENDING COMMENTS
Patient seen and examined. I called patient's daughter, Opal Abrams at [phone] to discuss goals of patient's hospitalization. I discussed that I had heard she was interested in hospice and comfort care. However, when I spoke with her, she told me that while she had agreed to DNR/DNI status and wanted her father to be comfortable overall, she wanted all medicines and medical avenues to be used to stabilize and control his HR and to address whatever other medical issues we may find. She reports patient has been extremely weak over the course of the past week and has had multiple falls where it has been difficult to get him off the floor. When she went to her oncologist today for chemotherapy, she reported that the blood pressure was so low it could not be read by the machine, so he was advised to come to the ED. Patient continues to appear dry on my exam, agree with additional IVF. Can try low dose BB with hold parameters though if continues to be in RVR might need to consider digoxin. Anticoagulation was discussed with daughter by team, but per her, patient recently was at [facility] for hemoptysis, felt to be due to lung mets from bladder CA so will hold off as patient has had that and other difficulties. Given that daughter wants all medical avenues to be pursued, will continue patient on tele monitoring. Confirmed patient is DNR/DNI.

## 2021-01-29 NOTE — H&P ADULT - PROBLEM SELECTOR PLAN 6
RISK                                                          Points  [] Previous VTE                                           3  [] Thrombophilia                                        2  [] Lower limb paralysis                              2   [] Current Cancer                                       2   [x] Immobilization > 24 hrs                        1  [] ICU/CCU stay > 24 hours                       1  [x] Age > 60                                                   1    scd boots and sc lovenox

## 2021-01-29 NOTE — H&P ADULT - PROBLEM SELECTOR PLAN 7
She says that he has been made comfort care, DNR, DNI, and she does want IVF (contrary to the MOLST form), antibioitc and all medical treatment and is ok with ct scans and mri. Treatment as normal pt but no aggressive measures like blood thinners.  She wants to set up home hospice.

## 2021-01-29 NOTE — ED PROVIDER NOTE - CLINICAL SUMMARY MEDICAL DECISION MAKING FREE TEXT BOX
81 y/o man, h/o bladder cancer, sent in from oncologist Dr. Oviedo's office for hypotension, AMS, generalized weakness, with new onset A-fib diagnosed--labs, IVF, EKG, CXR, attempting to reach Dr. Oviedo.  D/w hospitalist, Dr. Prabhakar, with anticipation for admission to set up home hospice.

## 2021-01-29 NOTE — H&P ADULT - HISTORY OF PRESENT ILLNESS
Patient is 80 year old man, from home lives with sree with h/o bladder cancer mets to lungs, sent in from oncologist Dr. Oviedo's office for hypotension. Pt presented to generalized weakness, with new onset A-fib diagnosed in ed.   D/w with Pt's daughter, Opal Abrams, [phone].  She confirms that Pt has been confused recently.  She says that he has been made comfort care, DNR, DNI, and she does want IVF (contrary to the MOLST form), antibioitc and all medical treatment and is ok with ct scans and mri. Treatment as normal pt but no aggresive measures likel blood thinners.  She wants to set up home hospice.  As per daughter Pt went to oncologist office for starting more aggresive treatment for cancer but pt bp was very low. Daughter also reports 2 week ago pt was coughing up blood in clots and then was found to have aspiration pneumonia. Pt sometimes reports his stomach hurts and some times its all over pain. Pt sometimes is there and some times very confused and in past with signs of dementia.   GOC : dnr/dni/ comfort measures with medical treatment.

## 2021-01-29 NOTE — H&P ADULT - PROBLEM SELECTOR PLAN 1
Pt came with low bp   s/p iv fluids bp improved  will hold off on anticoagulation as pt is for comfort measures  resumed salt tabs Pt came with low bp   s/p iv fluids bp improved  will hold off on anticoagulation as pt is for comfort measures  resumed salt tabs  will start metoprolol 12.5mg bid with holding parameters to control bp  If still hr not controlled will use digoxin

## 2021-01-29 NOTE — ED ADULT NURSE NOTE - OBJECTIVE STATEMENT
pt is here for hypotension.  BIBA, sending from Oncology office for hypotension and new onset of A-fib, a/ox1, denied chest pain or sob, no distress noted at this time.

## 2021-01-29 NOTE — H&P ADULT - PROBLEM SELECTOR PLAN 3
Pt has hx of bladder cancer with mets to lungs   sometimes reports stomach pain  morphine prn for pain

## 2021-01-29 NOTE — ED PROVIDER NOTE - OBJECTIVE STATEMENT
79 y/o man, h/o bladder cancer, 81 y/o man, h/o bladder cancer, sent in from oncologist Dr. Oviedo's office for hypotension, AMS, generalized weakness, with new onset A-fib diagnosed.  No other reported symptoms thus far, but unable to reach Dr. Oviedo at this point.  D/w with Pt's daughter, Opal Abrams, [phone].  She confirms that Pt has been confused recently.  She says that he has been made comfort care, DNR, DNI, and she does want IVF (contrary to the MOLST form).  She wants to set up home hospice.

## 2021-01-30 NOTE — DISCHARGE NOTE PROVIDER - NSDCMRMEDTOKEN_GEN_ALL_CORE_FT
Sodium Chloride 1 g oral tablet: 1 tab(s) orally once a day  Vitamin B12 250 mcg oral tablet: 1 tab(s) orally once a day

## 2021-01-30 NOTE — CONSULT NOTE ADULT - SUBJECTIVE AND OBJECTIVE BOX
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 Oviedo's office for hypotension. Pt presented to generalized weakness, with new onset A-fib diagnosed in ed.   D/w with Pt's daughter, Opal Abrams, [phone].  She confirms that Pt has been confused recently.  She says that he has been made comfort care, DNR, DNI, and she does want IVF (contrary to the MOLST form), antibioitc and all medical treatment and is ok with ct scans and mri. Treatment as normal pt but no aggresive measures likel blood thinners.  She wants to set up home hospice.  As per daughter Pt went to oncologist office for starting more aggresive treatment for cancer but pt bp was very low. Daughter also reports 2 week ago pt was coughing up blood in clots and then was found to have aspiration pneumonia. Pt sometimes reports his stomach hurts and some times its all over pain. Pt sometimes is there and some times very confused and in past with signs of dementia.   GOC : dnr/dni/ comfort measures with medical treatment. (29 Jan 2021 18:08)      INTERVAL HPI: Patient seen and examined at bedside; Events noted; Patient c/o     PMH/PSH as above    FmHx/Sox Hx NC    ROS as above; pt is not able to provide detailed review of systems  General: Noncontributory;	Skin/Breast: NC;Ophthalmologic:NC; ENMT: NC; Respiratory and Thorax: NC; Cardiovascular: NC; 	  Gastrointestinal: NC; Genitourinary:NC; 	Musculoskeletal:NC; Neurological: NC; Psychiatric: NC; Hematology/Lymphatics: NC; Endocrine: NC; Allergic/Immunologic: NC    MEDICATIONS  (STANDING):  cyanocobalamin 1000 MICROGram(s) Oral daily  dextrose 5% + sodium chloride 0.9%. 1000 milliLiter(s) (75 mL/Hr) IV Continuous <Continuous>  enoxaparin Injectable 40 milliGRAM(s) SubCutaneous daily  metoprolol tartrate 12.5 milliGRAM(s) Oral two times a day  sodium chloride 1 Gram(s) Oral daily    MEDICATIONS  (PRN):  morphine  - Injectable 2 milliGRAM(s) IV Push every 6 hours PRN Severe Pain (7 - 10)      Vital Signs Last 24 Hrs  T(C): 36.7 (30 Jan 2021 05:30), Max: 37 (29 Jan 2021 22:40)  T(F): 98.1 (30 Jan 2021 05:30), Max: 98.6 (29 Jan 2021 22:40)  HR: 82 (30 Jan 2021 05:30) (76 - 124)  BP: 126/61 (30 Jan 2021 05:30) (91/58 - 126/61)  BP(mean): --  RR: 18 (30 Jan 2021 05:30) (18 - 20)  SpO2: 99% (30 Jan 2021 05:30) (98% - 100%)  _________________  PHYSICAL EXAM:  ---------------------------  GEN: NAD; NC/AT; A and O x   LUNGS: no wheezing; decreased bilateral air entry; no use of accessory muscles for breathing  HEART: Nl S1 S2; no M   ABDOMEN: Soft, Nontender, non distended  EXTREMITIES: no cyanosis; no edema; warm and dry  NERVOUS SYSTEM:  Awake and alert; no focal neuro  deficits    _________________________________________________  LABS:                        8.6    10.07 )-----------( 242      ( 29 Jan 2021 16:22 )             29.7     01-29    136  |  103  |  32<H>  ----------------------------<  131<H>  4.2   |  26  |  1.36<H>    Ca    8.8      29 Jan 2021 16:22    TPro  7.0  /  Alb  2.2<L>  /  TBili  0.4  /  DBili  x   /  AST  23  /  ALT  23  /  AlkPhos  108  01-29    PT/INR - ( 29 Jan 2021 16:22 )   PT: 15.6 sec;   INR: 1.33 ratio         PTT - ( 29 Jan 2021 16:22 )  PTT:29.0 sec  CAPILLARY BLOOD GLUCOSE                     HPI:  80 year old man, from home lives with daughter with Stage IV bladder cancer mets to lungs, gross hematuria, sent in hypotension secondary to ADAM w/ RVR. Pt presented to generalized weakness, with new onset A-fib.   D/w with Pt's daughter, Opal Abrams, [phone].  She confirms that Pt has been confused recently.  She says that he has been made comfort care, DNR, DNI, and she does want IVF, antibioitc and all medical treatment and is ok with ct scans and mri. Treatment as normal pt but no aggresive measures like blood thinners.  She wants to set up home hospice.    GOC is palliative w/ dnr/dni/ comfort measures with medical treatment. ECOG PS is 4 at this time and not a candidate for palliative systemic chemotherapy.     INTERVAL HPI: Patient seen and examined at bedside; Events noted; Patient c/o feeling weak    PMH/PSH as aboveProblem    FmHx/Sox Hx NC    ROS as above; pt is not able to provide detailed review of systems  General: Noncontributory;	Skin/Breast: NC;Ophthalmologic:NC; ENMT: NC; Respiratory and Thorax: NC; Cardiovascular: NC; 	  Gastrointestinal: NC; Genitourinary:NC; 	Musculoskeletal:NC; Neurological: NC; Psychiatric: NC; Hematology/Lymphatics: NC; Endocrine: NC; Allergic/Immunologic: NC    MEDICATIONS  (STANDING):  cyanocobalamin 1000 MICROGram(s) Oral daily  dextrose 5% + sodium chloride 0.9%. 1000 milliLiter(s) (75 mL/Hr) IV Continuous <Continuous>  enoxaparin Injectable 40 milliGRAM(s) SubCutaneous daily  metoprolol tartrate 12.5 milliGRAM(s) Oral two times a day  sodium chloride 1 Gram(s) Oral daily    MEDICATIONS  (PRN):  morphine  - Injectable 2 milliGRAM(s) IV Push every 6 hours PRN Severe Pain (7 - 10)      Vital Signs Last 24 Hrs  T(C): 36.7 (30 Jan 2021 05:30), Max: 37 (29 Jan 2021 22:40)  T(F): 98.1 (30 Jan 2021 05:30), Max: 98.6 (29 Jan 2021 22:40)  HR: 82 (30 Jan 2021 05:30) (76 - 124)  BP: 126/61 (30 Jan 2021 05:30) (91/58 - 126/61)  BP(mean): --  RR: 18 (30 Jan 2021 05:30) (18 - 20)  SpO2: 99% (30 Jan 2021 05:30) (98% - 100%)  _________________  PHYSICAL EXAM:  ---------------------------  GEN: NAD; NC/AT; A and O x 3  LUNGS: no wheezing; decreased bilateral air entry; no use of accessory muscles for breathing  HEART: IRIR; no M  ABDOMEN: Soft, Nontender, non distended  EXTREMITIES: no cyanosis; no edema; warm and dry  NERVOUS SYSTEM:  Awake and alert; no focal neuro  deficits    _________________________________________________  LABS:                        8.6    10.07 )-----------( 242      ( 29 Jan 2021 16:22 )             29.7     01-29    136  |  103  |  32<H>  ----------------------------<  131<H>  4.2   |  26  |  1.36<H>    Ca    8.8      29 Jan 2021 16:22    TPro  7.0  /  Alb  2.2<L>  /  TBili  0.4  /  DBili  x   /  AST  23  /  ALT  23  /  AlkPhos  108  01-29    PT/INR - ( 29 Jan 2021 16:22 )   PT: 15.6 sec;   INR: 1.33 ratio         PTT - ( 29 Jan 2021 16:22 )  PTT:29.0 sec  CAPILLARY BLOOD GLUCOSE

## 2021-01-30 NOTE — PHYSICAL THERAPY INITIAL EVALUATION ADULT - PERTINENT HX OF CURRENT PROBLEM, REHAB EVAL
Pt admitted with generalized weakness and new onset A-FIB; with stage IV bladder CA; mets to lungs, gross hematuria,

## 2021-01-30 NOTE — PROGRESS NOTE ADULT - PROBLEM SELECTOR PLAN 1
Patient admitted with new a-fib with RVR that spontaneously converted overnight  -Will monitor for 1 more tonight on tele with likely dc in the AM  -Continue metoprolol 12.5mg po BID  -Encourage po fluid intake

## 2021-01-30 NOTE — PHYSICAL THERAPY INITIAL EVALUATION ADULT - CRITERIA FOR SKILLED THERAPEUTIC INTERVENTIONS
family prefers D/C home with assist/impairments found/functional limitations in following categories/risk reduction/prevention/anticipated discharge recommendation

## 2021-01-30 NOTE — CHART NOTE - NSCHARTNOTEFT_GEN_A_CORE
spoke with Dr. Oviedo on the phone - he will speak with the daughters again about hospice services as further chemo treatment is no longer beneficial to patient due to increased risk, pt has become too debilitated.     Pt can go home with home care and PT today or tomorrow. PT is recommending home PT    Spoke with Opal the daughter on the phone - she knows about hospice program because her step mother was on hospice. She feels that patient has enough help at home and doesn't feel hospice is needed yet. Support given.

## 2021-01-30 NOTE — CONSULT NOTE ADULT - ASSESSMENT
Full consult to follow Problem #1 Bladder Ca - advanced w/ lung mets s/p palliative chemotherapy and immunotherapy and RT to bladder; ECOG PS 3/4; not a candidate for further palliative chemotherapy at this time  -pt is an appropriate candidate for home hospiece    Problem #2 ADAM/RVR - medical management; AC contraindicated given pt has recent history of gross hematuria from radiation effects as well as the tumor    Problem #3 Anemia - multifactorial; no need for PRBC unless hg < 7 g/dL  -avoid non-essential daily phlebotomy    DNR/DNI; call with questions [phone]; d/w Dr. Cano and Jeremie from palliative care team

## 2021-01-30 NOTE — PROGRESS NOTE ADULT - SUBJECTIVE AND OBJECTIVE BOX
[facility] Medicine  Pager: [phone]    Patient is a 80y old  Male who presents with a chief complaint of low blood pressure and home hospice (30 Jan 2021 07:38)      SUBJECTIVE / OVERNIGHT EVENTS: Overnight, slightly before MN, patient appeared to convert back from a-fib to NSR. Remains in NSR this AM. Patient offers no complaints. Denies and pain or SOB. No bleeding reported.     MEDICATIONS  (STANDING):  cyanocobalamin 1000 MICROGram(s) Oral daily  dextrose 5% + sodium chloride 0.9%. 1000 milliLiter(s) (75 mL/Hr) IV Continuous <Continuous>  enoxaparin Injectable 40 milliGRAM(s) SubCutaneous daily  metoprolol tartrate 12.5 milliGRAM(s) Oral two times a day  sodium chloride 1 Gram(s) Oral daily    MEDICATIONS  (PRN):      Vital Signs Last 24 Hrs  T(C): 36.7 (01-30-21 @ 15:42)  T(F): 98.1 (01-30-21 @ 15:42), Max: 98.6 (01-29-21 @ 22:40)  HR: 81 (01-30-21 @ 15:42) (76 - 124)  BP: 142/80 (01-30-21 @ 15:42)  BP(mean): --  RR: 18 (01-30-21 @ 15:42) (18 - 20)  SpO2: 100% (01-30-21 @ 15:42) (95% - 100%)  Wt(kg): --    CAPILLARY BLOOD GLUCOSE        I&O's Summary      PHYSICAL EXAM:  GENERAL: NAD, frail, lying in bed  HEAD:  Atraumatic, Normocephalicclear  NECK: Supple, No JVD  CHEST/LUNG: Clear to auscultation bilaterally; No wheeze  HEART: Regular rate and rhythm; No murmurs noted  ABDOMEN: Soft, Nontender, Nondistended; Bowel sounds present  EXTREMITIES:  2+ Peripheral Pulses, No clubbing, cyanosis, or edema  PSYCH: AAOx3, cooperative  SKIN: No rashes or lesions    LABS:                        7.7    9.03  )-----------( 220      ( 30 Jan 2021 12:20 )             25.8     01-30    139  |  107  |  32<H>  ----------------------------<  120<H>  4.2   |  27  |  1.18    Ca    8.3<L>      30 Jan 2021 08:23  Phos  3.5     01-30  Mg     1.9     01-30    TPro  6.1  /  Alb  1.9<L>  /  TBili  0.4  /  DBili  x   /  AST  13  /  ALT  17  /  AlkPhos  90  01-30    PT/INR - ( 29 Jan 2021 16:22 )   PT: 15.6 sec;   INR: 1.33 ratio         PTT - ( 29 Jan 2021 16:22 )  PTT:29.0 sec  CARDIAC MARKERS ( 29 Jan 2021 16:22 )  <0.015 ng/mL / x     / x     / x     / x          RADIOLOGY & ADDITIONAL TESTS:    Imaging Personally Reviewed:    Consultant(s) Notes Reviewed:  oncology    Care Discussed with Consultants/Other Providers: Dr. Cano (palliative) re: advanced care planning/disposition planning    Assessment and Plan:

## 2021-01-30 NOTE — PATIENT PROFILE ADULT - NSPROPTRIGHTSUPPORTPHONE_GEN_A_NUR
Left message for patient to return call. Please inform patient appt on 4/20/20 is canceled due to COVID-19. Please ask if patient is able to do a VIDEO visit with Dr. Herrera is coming week. If yes, please schedule appt appropriately. Please inform patient she does not need to have labs done prior to appt per Dr. Herrera.    same as above

## 2021-01-30 NOTE — PROGRESS NOTE ADULT - ASSESSMENT
81 yo M with stage IV bladder CA with lung mets c/b hematuria and hemoptysis, hx DVT (off AC 2/2 bleeding and s/p IVC filter) who presents with hypotension and new atrial fibrillation 2/2 RVR likely 2/2 dehydration and poor po intake.

## 2021-01-30 NOTE — PHYSICAL THERAPY INITIAL EVALUATION ADULT - ADDITIONAL COMMENTS
Attempted to call family however unable to reach daughter, grand daughter. Per chart review and per pt, he lives in a private home with family, HHA and daughter assists with ADLS. Pt claims he is able to ambulate short distance w/RW and assist, owns a w/c and requires assistance with all ADLs

## 2021-01-31 NOTE — PROGRESS NOTE ADULT - ASSESSMENT
79 yo M with stage IV bladder CA with lung mets c/b hematuria and hemoptysis, hx DVT (off AC 2/2 bleeding and s/p IVC filter) admitted to tele for management of hypotension and new-onset AF with RVR, likely 2/2 dehydration and poor po intake.

## 2021-01-31 NOTE — PROGRESS NOTE ADULT - SUBJECTIVE AND OBJECTIVE BOX
PGY-1 Progress Note discussed with attending    PAGER #: [[phone]] TILL 5:00 PM  PLEASE CONTACT ON CALL TEAM:  - On Call Team (Please refer to Samina) FROM 5:00 PM - 8:30PM  - Nightfloat Team FROM 8:30 -7:30 AM    CHIEF COMPLAINT & BRIEF HOSPITAL COURSE: 80M from home with darohit PMH bladder cancer mets to lungs, sent 1/29 by oncologist Dr. Oviedo for hypotension, noted in ED to have generalized weakness and new-onset AF, confirmed by daughter Opal ([phone]) to have hemoptysis, asp PNA, and increasing confusion recently, DNR/DNI, modified comfort care with medical mgmt such as IVF, abx, imaging, but avoidance of aggressive measures (eg AC). Hospice discussed, but daughter endorses having enough support at home for now. Noted to have HR 120s and SBP 90s in ED. Labs notable for Hb 8.6, Cr 1.36, trop negative x1. EKG with AF RVR. Admitted to tele for management of hypotension and new-onset AF.     INTERVAL HPI/OVERNIGHT EVENTS: NAEON. VS wnl. Noted to have additional episodes of AF with RVR on tele, metoprolol increased to 25mg BID, will continue to monitor today. Daughter Opal informed and agreeable. Continuing home NaCl, Na wnl. BCx NGTD. S/p 24hrs IVF 75cc/hr, tolerating soft diet.     MEDICATIONS  (STANDING):  cyanocobalamin 1000 MICROGram(s) Oral daily  dextrose 5% + sodium chloride 0.9%. 1000 milliLiter(s) (75 mL/Hr) IV Continuous <Continuous>  enoxaparin Injectable 40 milliGRAM(s) SubCutaneous daily  metoprolol tartrate 25 milliGRAM(s) Oral two times a day  sodium chloride 1 Gram(s) Oral daily    MEDICATIONS  (PRN):    REVIEW OF SYSTEMS: limited by patient condition, denies pain, endorses sleepiness    Vital Signs Last 24 Hrs  T(C): 36.7 (31 Jan 2021 11:21), Max: 36.7 (30 Jan 2021 15:42)  T(F): 98 (31 Jan 2021 11:21), Max: 98.1 (30 Jan 2021 15:42)  HR: 76 (31 Jan 2021 11:21) (75 - 85)  BP: 149/74 (31 Jan 2021 11:21) (128/71 - 156/82)  BP(mean): --  RR: 18 (31 Jan 2021 11:21) (18 - 18)  SpO2: 100% (31 Jan 2021 11:21) (95% - 100%)    PHYSICAL EXAMINATION:  GENERAL: thin, lying in bed, sleeping  HEAD:  Atraumatic, Normocephalic  EYES:  conjunctiva and sclera clear  NECK: Supple  CHEST/LUNG: no increased WOB, good air movement b/l  HEART: regular rate and fast rhythm at time of exam, irregularity intermittently on tele  ABDOMEN: Soft, Nontender, Nondistended; Bowel sounds present  NERVOUS SYSTEM: alert and oriented x2 (had just woken up)  EXTREMITIES:  2+ Peripheral Pulses, No edema  SKIN: warm dry                          7.7    9.03  )-----------( 220      ( 30 Jan 2021 12:20 )             25.8     01-30    139  |  107  |  32<H>  ----------------------------<  120<H>  4.2   |  27  |  1.18    Ca    8.3<L>      30 Jan 2021 08:23  Phos  3.5     01-30  Mg     1.9     01-30    TPro  6.1  /  Alb  1.9<L>  /  TBili  0.4  /  DBili  x   /  AST  13  /  ALT  17  /  AlkPhos  90  01-30    LIVER FUNCTIONS - ( 30 Jan 2021 08:23 )  Alb: 1.9 g/dL / Pro: 6.1 g/dL / ALK PHOS: 90 U/L / ALT: 17 U/L DA / AST: 13 U/L / GGT: x           CARDIAC MARKERS ( 29 Jan 2021 16:22 )  <0.015 ng/mL / x     / x     / x     / x          PT/INR - ( 29 Jan 2021 16:22 )   PT: 15.6 sec;   INR: 1.33 ratio         PTT - ( 29 Jan 2021 16:22 )  PTT:29.0 sec    CAPILLARY BLOOD GLUCOSE      RADIOLOGY & ADDITIONAL TESTS:                   PGY-1 Progress Note discussed with attending    PAGER #: [[phone]] TILL 5:00 PM  PLEASE CONTACT ON CALL TEAM:  - On Call Team (Please refer to Samina) FROM 5:00 PM - 8:30PM  - Nightfloat Team FROM 8:30 -7:30 AM    CHIEF COMPLAINT & BRIEF HOSPITAL COURSE: 80M from home with daarvinetr PMH bladder cancer mets to lungs, sent 1/29 by oncologist Dr. Oviedo for hypotension, noted in ED to have generalized weakness and new-onset AF, confirmed by daughter Opal ([phone]) to have hemoptysis, asp PNA, and increasing confusion recently, DNR/DNI, modified comfort care with medical mgmt such as IVF, abx, imaging, but avoidance of aggressive measures (eg AC). Hospice discussed, but daughter endorses having enough support at home for now. Noted to have HR 120s and SBP 90s in ED. Labs notable for Hb 8.6, Cr 1.36, trop negative x1. EKG with AF RVR. Admitted to tele for management of hypotension and new-onset AF.     INTERVAL HPI/OVERNIGHT EVENTS: NAEON. VS wnl. Noted to have additional episodes of AF with RVR on tele, metoprolol increased to 25mg BID, will continue to monitor today, fu repeat EKG. Daughter Opal informed and agreeable. Continuing home NaCl, Na wnl. BCx NGTD. S/p 24hrs IVF 75cc/hr, tolerating soft diet.     MEDICATIONS  (STANDING):  cyanocobalamin 1000 MICROGram(s) Oral daily  dextrose 5% + sodium chloride 0.9%. 1000 milliLiter(s) (75 mL/Hr) IV Continuous <Continuous>  enoxaparin Injectable 40 milliGRAM(s) SubCutaneous daily  metoprolol tartrate 25 milliGRAM(s) Oral two times a day  sodium chloride 1 Gram(s) Oral daily    MEDICATIONS  (PRN):    REVIEW OF SYSTEMS: limited by patient condition, denies pain, endorses sleepiness    Vital Signs Last 24 Hrs  T(C): 36.7 (31 Jan 2021 11:21), Max: 36.7 (30 Jan 2021 15:42)  T(F): 98 (31 Jan 2021 11:21), Max: 98.1 (30 Jan 2021 15:42)  HR: 76 (31 Jan 2021 11:21) (75 - 85)  BP: 149/74 (31 Jan 2021 11:21) (128/71 - 156/82)  BP(mean): --  RR: 18 (31 Jan 2021 11:21) (18 - 18)  SpO2: 100% (31 Jan 2021 11:21) (95% - 100%)    PHYSICAL EXAMINATION:  GENERAL: thin, lying in bed, sleeping  HEAD:  Atraumatic, Normocephalic  EYES:  conjunctiva and sclera clear  NECK: Supple  CHEST/LUNG: no increased WOB, good air movement b/l  HEART: regular rate and fast rhythm at time of exam, irregularity intermittently on tele  ABDOMEN: Soft, Nontender, Nondistended; Bowel sounds present  NERVOUS SYSTEM: alert and oriented x2 (had just woken up)  EXTREMITIES:  2+ Peripheral Pulses, No edema  SKIN: warm dry                          7.7    9.03  )-----------( 220      ( 30 Jan 2021 12:20 )             25.8     01-30    139  |  107  |  32<H>  ----------------------------<  120<H>  4.2   |  27  |  1.18    Ca    8.3<L>      30 Jan 2021 08:23  Phos  3.5     01-30  Mg     1.9     01-30    TPro  6.1  /  Alb  1.9<L>  /  TBili  0.4  /  DBili  x   /  AST  13  /  ALT  17  /  AlkPhos  90  01-30    LIVER FUNCTIONS - ( 30 Jan 2021 08:23 )  Alb: 1.9 g/dL / Pro: 6.1 g/dL / ALK PHOS: 90 U/L / ALT: 17 U/L DA / AST: 13 U/L / GGT: x           CARDIAC MARKERS ( 29 Jan 2021 16:22 )  <0.015 ng/mL / x     / x     / x     / x          PT/INR - ( 29 Jan 2021 16:22 )   PT: 15.6 sec;   INR: 1.33 ratio         PTT - ( 29 Jan 2021 16:22 )  PTT:29.0 sec    CAPILLARY BLOOD GLUCOSE      RADIOLOGY & ADDITIONAL TESTS:

## 2021-01-31 NOTE — PROGRESS NOTE ADULT - PROBLEM SELECTOR PLAN 1
Patient admitted with new a-fib with RVR that spontaneously converted overnight 1/29-30  -AF with RVR again noted tele 1/31  -metoprolol 12.5mg po BID started this adm, increase 1/31 to 25mg BID  -daughter refusing AC as aggressive measure not consistent with patient's GOC  -encourage po fluid intake  -daughter updated 1/31 Patient admitted with new a-fib with RVR that spontaneously converted overnight 1/29-30  -AF with RVR again noted tele 1/31, fu repeat EKG  -metoprolol 12.5mg po BID started this adm, increase 1/31 to 25mg BID  -daughter refusing AC as aggressive measure not consistent with patient's GOC  -encourage po fluid intake  -daughter updated 1/31

## 2021-01-31 NOTE — PROGRESS NOTE ADULT - PROBLEM SELECTOR PLAN 3
H/H dropped from 8.6 to 7.7. No s/s bleeding. Normocytic  -Trend CBC daily  -Maintain active T/S (most recent 1/30)

## 2021-02-01 NOTE — DIETITIAN INITIAL EVALUATION ADULT. - PERTINENT MEDS FT
MEDICATIONS  (STANDING):  cyanocobalamin 1000 MICROGram(s) Oral daily  enoxaparin Injectable 40 milliGRAM(s) SubCutaneous daily  metoprolol tartrate 50 milliGRAM(s) Oral two times a day  sodium chloride 1 Gram(s) Oral daily    MEDICATIONS  (PRN):  acetaminophen   Tablet .. 650 milliGRAM(s) Oral every 6 hours PRN Temp greater or equal to 38C (100.4F), Mild Pain (1 - 3)

## 2021-02-01 NOTE — DIETITIAN INITIAL EVALUATION ADULT. - PROBLEM SELECTOR PLAN 1
Pt came with low bp   s/p iv fluids bp improved  will hold off on anticoagulation as pt is for comfort measures  resumed salt tabs  will start metoprolol 12.5mg bid with holding parameters to control bp  If still hr not controlled will use digoxin

## 2021-02-01 NOTE — DIETITIAN INITIAL EVALUATION ADULT. - PROBLEM SELECTOR PLAN 2
Pt has hx of off and on confusion  most likely 2/2 dementia vs weakness from decreased oral intake  supportive measures for now

## 2021-02-01 NOTE — PROGRESS NOTE ADULT - PROBLEM SELECTOR PLAN 1
Patient admitted with new a-fib with RVR that spontaneously converted overnight 1/29-30  -AF with RVR again noted tele 1/31 and 2/1  -non-reproducible CP 2/1 am, trop negative x1, EKG with TWI V1  -repeat trop and EKG this pm  -metoprolol 12.5mg po BID this adm, increase BID dosing to 25mg 1/31, 50mg 2/1  -daughter refusing AC as aggressive measure not consistent with patient's GOC, will discuss asa  -encourage po fluid intake  -daughter updated 1/31

## 2021-02-01 NOTE — PROGRESS NOTE ADULT - PROBLEM SELECTOR PLAN 3
H/H dropped from 8.6 to 7.7. No s/s bleeding. Normocytic  -Trend CBC daily  -Maintain active T/S (most recent 1/30), will recent for 2/2 am

## 2021-02-01 NOTE — PROGRESS NOTE ADULT - SUBJECTIVE AND OBJECTIVE BOX
PGY-1 Progress Note discussed with attending    PAGER #: [[phone]] TILL 5:00 PM  PLEASE CONTACT ON CALL TEAM:  - On Call Team (Please refer to Samina) FROM 5:00 PM - 8:30PM  - Nightfloat Team FROM 8:30 -7:30 AM    CHIEF COMPLAINT & BRIEF HOSPITAL COURSE: 80M from home with daughter PMH bladder cancer mets to lungs, sent 1/29 by oncologist Dr. Oviedo for hypotension, noted in ED to have generalized weakness and new-onset AF, confirmed by daughter Opal ([phone]) to have hemoptysis, asp PNA, and increasing confusion recently, DNR/DNI, modified comfort care with medical mgmt such as IVF, abx, imaging, but avoidance of aggressive measures (eg AC). Hospice discussed, but daughter endorses having enough support at home for now. Noted to have HR 120s and SBP 90s in ED. Labs notable for Hb 8.6, Cr 1.36, trop negative x1. EKG with AF RVR. Admitted to tele for management of hypotension and new-onset AF.     INTERVAL HPI/OVERNIGHT EVENTS: NAEON. Continuing to have episodes of AF overnight to 130s, SBPs variable with high 150s. Increased metoprolol to 50mg BID, with first dose this am. Patient reports CP this am, EKG with x1 T-wave inversion lead V1, trop negative x1. Will repeat EKG and trop in pm and discuss starting asa with daughter, AC and cath not consistent with GOC. Tolerating soft diet, labs stable, BCx NGTD.    MEDICATIONS  (STANDING):  cyanocobalamin 1000 MICROGram(s) Oral daily  dextrose 5% + sodium chloride 0.9%. 1000 milliLiter(s) (75 mL/Hr) IV Continuous <Continuous>  enoxaparin Injectable 40 milliGRAM(s) SubCutaneous daily  metoprolol tartrate 25 milliGRAM(s) Oral two times a day  metoprolol tartrate 25 milliGRAM(s) Oral once  sodium chloride 1 Gram(s) Oral daily    MEDICATIONS  (PRN):  acetaminophen   Tablet .. 650 milliGRAM(s) Oral every 6 hours PRN Temp greater or equal to 38C (100.4F), Mild Pain (1 - 3)        REVIEW OF SYSTEMS:  CONSTITUTIONAL: No fever or fatigue, feels well  RESPIRATORY: No cough, No shortness of breath  CARDIOVASCULAR: +chest pain, no palpitations, dizziness, or leg swelling  GASTROINTESTINAL: No abdominal pain. No nausea, vomiting; No diarrhea or constipation.   GENITOURINARY: No dysuria or hematuria, urinary frequency  NEUROLOGICAL: No headache  SKIN: No itching or rashes  PSYCH: no issues  MSK: no arthralgias or myalgias  all other systems reviewed and are negative     Vital Signs Last 24 Hrs  T(C): 36.6 (01 Feb 2021 07:23), Max: 36.9 (31 Jan 2021 23:07)  T(F): 97.8 (01 Feb 2021 07:23), Max: 98.4 (31 Jan 2021 23:07)  HR: 70 (01 Feb 2021 07:23) (70 - 132)  BP: 150/76 (01 Feb 2021 07:23) (93/77 - 156/80)  BP(mean): --  RR: 18 (01 Feb 2021 07:23) (18 - 18)  SpO2: 100% (01 Feb 2021 07:23) (98% - 100%)    PHYSICAL EXAMINATION:  GENERAL: thin, lying in bed  HEAD:  Atraumatic, Normocephalic  EYES:  conjunctiva and sclera clear  NECK: Supple  CHEST/LUNG: no increased WOB, good air movement b/l  HEART: regular rate and fast rhythm at time of exam, irregularity intermittently on tele, no reproducible CP  ABDOMEN: Soft, Nontender, Nondistended; Bowel sounds present  NERVOUS SYSTEM: alert and oriented x3  EXTREMITIES:  2+ Peripheral Pulses, No edema  SKIN: warm dry  HEME: no unusual bruising noted  PSYCH: appropriate mood and affect                          8.0    6.96  )-----------( 219      ( 01 Feb 2021 06:35 )             27.2     02-01    140  |  106  |  28<H>  ----------------------------<  90  4.0   |  28  |  0.99    Ca    8.9      01 Feb 2021 06:35  Phos  3.5     02-01  Mg     2.1     02-01    TPro  6.4  /  Alb  1.9<L>  /  TBili  0.5  /  DBili  x   /  AST  14  /  ALT  20  /  AlkPhos  102  02-01    LIVER FUNCTIONS - ( 01 Feb 2021 06:35 )  Alb: 1.9 g/dL / Pro: 6.4 g/dL / ALK PHOS: 102 U/L / ALT: 20 U/L DA / AST: 14 U/L / GGT: x           CARDIAC MARKERS ( 01 Feb 2021 08:46 )  <0.015 ng/mL / x     / x     / x     / x              CAPILLARY BLOOD GLUCOSE      RADIOLOGY & ADDITIONAL TESTS:

## 2021-02-01 NOTE — DIETITIAN INITIAL EVALUATION ADULT. - ADD RECOMMEND
Consider SLP for appropriate Texture (pt safety), least restrictive diet, therapeutically), with an appropriate PO supplement (check thickness of liquids). MD to monitor. RD available.

## 2021-02-01 NOTE — DIETITIAN INITIAL EVALUATION ADULT. - OTHER INFO
Pt noted as confused, weak with decreased PO. Pt seen, asleep with pillow up to chin. It appears pt was for home hospice, now daughter not interested in hospice. RN notes indicate intake from 10-35% (when documented).

## 2021-02-01 NOTE — PROGRESS NOTE ADULT - ASSESSMENT
81 yo M with stage IV bladder CA with lung mets c/b hematuria and hemoptysis, hx DVT (off AC 2/2 bleeding and s/p IVC filter) admitted to tele for management of hypotension and new-onset AF with RVR, likely 2/2 dehydration and poor po intake.

## 2021-02-01 NOTE — DIETITIAN INITIAL EVALUATION ADULT. - PERTINENT LABORATORY DATA
02-01 Na140 mmol/L Glu 90 mg/dL K+ 4.0 mmol/L Cr  0.99 mg/dL BUN 28 mg/dL<H>   02-01 Phos 3.5 mg/dL   02-01 Alb 1.9 g/dL<L>

## 2021-02-02 NOTE — PROGRESS NOTE ADULT - PROBLEM SELECTOR PLAN 3
H/H dropped from 8.6 to 7.7. No s/s bleeding. Normocytic  -Trend CBC daily  -Maintain active T/S (most recent 2/1)

## 2021-02-02 NOTE — PROGRESS NOTE ADULT - ASSESSMENT
complete note to follow   Assessment and Recommendation:   · Assessment	  Problem #1 Bladder Ca - advanced w/ lung mets s/p palliative chemotherapy and immunotherapy and RT to bladder  ECOG PS 3/4  -AMS, lethargic, cachectic  -not a candidate for further palliative chemotherapy at this time  -pt is an appropriate candidate for home hospice, will discuss with family    Problem #2 ADAM/RVR - medical management; AC contraindicated given pt has recent history of gross hematuria from radiation effects as well as the tumor    Problem #3 Anemia - multifactorial; no need for PRBC unless hg < 7 g/dL  -avoid non-essential daily phlebotomy    DNR/DNI  Thank you for the referral. Will continue to monitor the patient.  Please call with any questions [phone]  Above reviewed with Attending Dr. Oviedo

## 2021-02-02 NOTE — CONSULT NOTE ADULT - PROBLEM SELECTOR RECOMMENDATION 5
Please see discussion noted above in GOC conversation Multifactorial. Dementia, and malignancy.  s/p falls.  Dependent.  Hypoalbuminemia. High risk for skin failure.  Supportive care.  Frequent positioning.    PT recommending home pt.

## 2021-02-02 NOTE — CONSULT NOTE ADULT - CONSULT REASON
Metastatic bladder cancer to lungs. DNR/DNI on file.  Palliative care ti assess hospice eligibility. Metastatic bladder cancer to lungs. DNR/DNI on file.  Palliative care to assess hospice eligibility.

## 2021-02-02 NOTE — PROGRESS NOTE ADULT - SUBJECTIVE AND OBJECTIVE BOX
Patient is a 80y old  Male who presents with a chief complaint of low blood pressure and home hospice (01 Feb 2021 13:59)      SUBJECTIVE / OVERNIGHT EVENTS:    ADDITIONAL REVIEW OF SYSTEMS:    MEDICATIONS  (STANDING):  aMIOdarone    Tablet   Oral   aMIOdarone    Tablet 400 milliGRAM(s) Oral every 8 hours  aspirin  chewable 81 milliGRAM(s) Oral daily  cyanocobalamin 1000 MICROGram(s) Oral daily  enoxaparin Injectable 40 milliGRAM(s) SubCutaneous daily  metoprolol tartrate 50 milliGRAM(s) Oral two times a day  morphine  - Injectable 2 milliGRAM(s) IV Push every 4 hours  sodium chloride 1 Gram(s) Oral daily    MEDICATIONS  (PRN):  acetaminophen   Tablet .. 650 milliGRAM(s) Oral every 6 hours PRN Temp greater or equal to 38C (100.4F), Mild Pain (1 - 3)      Vital Signs Last 24 Hrs  T(C): 36.6 (02 Feb 2021 11:17), Max: 36.9 (01 Feb 2021 19:16)  T(F): 97.9 (02 Feb 2021 11:17), Max: 98.5 (01 Feb 2021 19:16)  HR: 138 (02 Feb 2021 11:17) (78 - 138)  BP: 114/85 (02 Feb 2021 11:17) (114/85 - 166/83)  BP(mean): --  RR: 19 (02 Feb 2021 11:17) (17 - 19)  SpO2: 96% (02 Feb 2021 11:17) (96% - 99%)      LABS:                        8.2    7.39  )-----------( 249      ( 02 Feb 2021 06:22 )             27.7     02-02    140  |  104  |  25<H>  ----------------------------<  77  3.8   |  28  |  0.95    Ca    9.1      02 Feb 2021 06:22  Phos  3.2     02-02  Mg     2.1     02-02    TPro  6.7  /  Alb  2.0<L>  /  TBili  0.5  /  DBili  x   /  AST  15  /  ALT  16  /  AlkPhos  106  02-02      CARDIAC MARKERS ( 01 Feb 2021 18:33 )  <0.015 ng/mL / x     / x     / x     / x      CARDIAC MARKERS ( 01 Feb 2021 08:46 )  <0.015 ng/mL / x     / x     / x     / x              COVID-19 PCR: NotDetec (29 Jan 2021 16:23)      RADIOLOGY & ADDITIONAL TESTS:  Imaging from Last 24 Hours:    Electrocardiogram/QTc Interval:    COORDINATION OF CARE:  Care Discussed with Consultants/Other Providers:   Patient is a 80y old  Male who presents with a chief complaint of low blood pressure and home hospice (01 Feb 2021 13:59)      SUBJECTIVE / OVERNIGHT EVENTS: events noted. Pt lethargic, answers questions with one word and some answers not appropriate to question      MEDICATIONS  (STANDING):  aMIOdarone    Tablet   Oral   aMIOdarone    Tablet 400 milliGRAM(s) Oral every 8 hours  aspirin  chewable 81 milliGRAM(s) Oral daily  cyanocobalamin 1000 MICROGram(s) Oral daily  enoxaparin Injectable 40 milliGRAM(s) SubCutaneous daily  metoprolol tartrate 50 milliGRAM(s) Oral two times a day  morphine  - Injectable 2 milliGRAM(s) IV Push every 4 hours  sodium chloride 1 Gram(s) Oral daily    MEDICATIONS  (PRN):  acetaminophen   Tablet .. 650 milliGRAM(s) Oral every 6 hours PRN Temp greater or equal to 38C (100.4F), Mild Pain (1 - 3)      Vital Signs Last 24 Hrs  T(C): 36.6 (02 Feb 2021 11:17), Max: 36.9 (01 Feb 2021 19:16)  T(F): 97.9 (02 Feb 2021 11:17), Max: 98.5 (01 Feb 2021 19:16)  HR: 138 (02 Feb 2021 11:17) (78 - 138)  BP: 114/85 (02 Feb 2021 11:17) (114/85 - 166/83)  BP(mean): --  RR: 19 (02 Feb 2021 11:17) (17 - 19)  SpO2: 96% (02 Feb 2021 11:17) (96% - 99%)    PHYSICAL EXAM:  ---------------------------  GEN: NAD; NC/AT; A and O x 1, cachectic  LUNGS: no wheezing; decreased bilateral air entry; no use of accessory muscles for breathing  HEART: IRIR; no M  ABDOMEN: Soft, Nontender, non distended  EXTREMITIES: no cyanosis; no edema; warm and dry  NERVOUS SYSTEM:  Awake and alert; not oriented, restless      LABS:                        8.2    7.39  )-----------( 249      ( 02 Feb 2021 06:22 )             27.7     02-02    140  |  104  |  25<H>  ----------------------------<  77  3.8   |  28  |  0.95    Ca    9.1      02 Feb 2021 06:22  Phos  3.2     02-02  Mg     2.1     02-02    TPro  6.7  /  Alb  2.0<L>  /  TBili  0.5  /  DBili  x   /  AST  15  /  ALT  16  /  AlkPhos  106  02-02      CARDIAC MARKERS ( 01 Feb 2021 18:33 )  <0.015 ng/mL / x     / x     / x     / x      CARDIAC MARKERS ( 01 Feb 2021 08:46 )  <0.015 ng/mL / x     / x     / x     / x              COVID-19 PCR: NotDetec (29 Jan 2021 16:23)

## 2021-02-02 NOTE — PROGRESS NOTE ADULT - PROBLEM SELECTOR PLAN 1
Patient admitted with new a-fib with RVR that spontaneously converted overnight 1/29-30  -AF with RVR again noted tele 1/31-2/2  -CP 2/1, EKG unremarkable, trops negative   -metoprolol 12.5mg po BID this adm, increase BID dosing to 25mg 1/31, 50mg 2/1  -added amio loading 2/2  -added asa  -daughter refusing AC as aggressive measure not consistent with patient's GOC, but agreeable with above measures  -encourage po fluid intake  -daughter updated 2/2 Patient admitted with new a-fib with RVR that spontaneously converted overnight 1/29-30  -AF with RVR again noted tele 1/31-2/2  -CP 2/1, EKG unremarkable, trops negative   -metoprolol 12.5mg po BID this adm, increase BID dosing to 25mg 1/31, 50mg 2/1  -added amio loading 2/2  -added asa  -may be worsened by pain, added morphine 2mg q4 prn severe pain  -daughter refusing AC as aggressive measure not consistent with patient's GOC, but agreeable with above measures  -encourage po fluid intake  -daughter updated 2/2

## 2021-02-02 NOTE — CONSULT NOTE ADULT - PROBLEM SELECTOR RECOMMENDATION 3
Multifactorial. Dementia, and malignancy.  Per daughter pt eats 3 meals daily prior to admission, they added a lot of beans for protein.  Currently poor po intake. Albumin 2.0 High risk for skin failure. Diet as tolerated.     Nutrition following. Pt AOx1, confused at the time of exam.  Bedbound.  Dependent.  Explained the dementia trajectory and that pt is hospice appropriate given his cognitive and functional decline.  Family to discuss hospice.  Palliative care will follow.

## 2021-02-02 NOTE — CONSULT NOTE ADULT - CONVERSATION DETAILS
Spoke with the patient's daughter Opal Abrams; discussed his oncology history, current clinical status and explained he is appropriate for hospice at home.  She shared she has had experience with hospice in the past and she is not ready for hospice for her father at this point.  She is very close to her father and is not ready to "let him go", which is why she wants him treated.     Explained that her father is has high risk of mortality regardless and him being hospitalized at this point is only a temporizing measure. Mrs. Abrams stated she needs time to speak with her siblings before making decision about comfort measures/hospice.  Palliative care will follow.   Reviewed MOLST; Pt remains DNR/DNI.  Continue with current clinical medical management.   All questions answered.  Support provided.

## 2021-02-02 NOTE — CONSULT NOTE ADULT - PROBLEM SELECTOR RECOMMENDATION 4
Multifactorial. Dementia, and malignancy.  s/p falls.  Dependent.  Hypoalbuminemia. High risk for skin failure.  Supportive care.  Frequent positioning.    PT recommending home pt. Multifactorial. Dementia, and malignancy.  Per daughter pt eats 3 meals daily prior to admission, they added a lot of beans for protein.  Currently poor po intake. Albumin 2.0 High risk for skin failure. Diet as tolerated.     Nutrition following.

## 2021-02-02 NOTE — PROGRESS NOTE ADULT - SUBJECTIVE AND OBJECTIVE BOX
PGY-1 Progress Note discussed with attending    PAGER #: [[phone]] TILL 5:00 PM  PLEASE CONTACT ON CALL TEAM:  - On Call Team (Please refer to Samina) FROM 5:00 PM - 8:30PM  - Nightfloat Team FROM 8:30 -7:30 AM    CHIEF COMPLAINT & BRIEF HOSPITAL COURSE: 80M from home with daughter PMH bladder cancer mets to lungs, sent 1/29 by oncologist Dr. Oviedo for hypotension, noted in ED to have generalized weakness and new-onset AF, confirmed by daughter Opal ([phone]) to have hemoptysis, asp PNA, and increasing confusion recently, DNR/DNI, modified comfort care with medical mgmt such as IVF, abx, imaging, but avoidance of aggressive measures (eg AC). Hospice discussed, but daughter endorses having enough support at home for now. Noted to have HR 120s and SBP 90s in ED. Labs notable for Hb 8.6, Cr 1.36, trop negative x1. EKG with AF RVR. Admitted to tele for management of hypotension and new-onset AF.     INTERVAL HPI/OVERNIGHT EVENTS: NAEON. Continuing to have episodes AF with RVR to 150s, with SBPs low to 86. Unable to increase metoprolol given BP, amio loading. Rayray Joseph updated today, on board with plan. Palliative following, reports daughter does not feel ready for hospice but wants to discuss it with her family, and endorses wish to have end-of-life care for her father to be done in [place].     MEDICATIONS  (STANDING):  aMIOdarone    Tablet   Oral   aMIOdarone    Tablet 400 milliGRAM(s) Oral every 8 hours  aspirin  chewable 81 milliGRAM(s) Oral daily  cyanocobalamin 1000 MICROGram(s) Oral daily  enoxaparin Injectable 40 milliGRAM(s) SubCutaneous daily  metoprolol tartrate 50 milliGRAM(s) Oral two times a day  morphine  - Injectable 2 milliGRAM(s) IV Push every 4 hours  sodium chloride 1 Gram(s) Oral daily    MEDICATIONS  (PRN):  acetaminophen   Tablet .. 650 milliGRAM(s) Oral every 6 hours PRN Temp greater or equal to 38C (100.4F), Mild Pain (1 - 3)    REVIEW OF SYSTEMS:  CONSTITUTIONAL: No fever or fatigue, feels well  RESPIRATORY: No cough, No shortness of breath  CARDIOVASCULAR: resolved chest pain, no palpitations, dizziness, or leg swelling  GASTROINTESTINAL: No abdominal pain. No nausea, vomiting; No diarrhea or constipation.   GENITOURINARY: No dysuria or hematuria, urinary frequency  NEUROLOGICAL: No headache  SKIN: No itching or rashes  PSYCH: no issues  MSK: no arthralgias or myalgias  all other systems reviewed and are negative     Vital Signs Last 24 Hrs  T(C): 36.6 (02 Feb 2021 11:17), Max: 36.9 (01 Feb 2021 19:16)  T(F): 97.9 (02 Feb 2021 11:17), Max: 98.5 (01 Feb 2021 19:16)  HR: 138 (02 Feb 2021 11:17) (78 - 138)  BP: 114/85 (02 Feb 2021 11:17) (114/85 - 166/83)  BP(mean): --  RR: 19 (02 Feb 2021 11:17) (17 - 19)  SpO2: 96% (02 Feb 2021 11:17) (96% - 99%)    PHYSICAL EXAMINATION:  GENERAL: thin, lying in bed  HEAD:  Atraumatic, Normocephalic  EYES:  conjunctiva and sclera clear  NECK: Supple  CHEST/LUNG: no increased WOB, good air movement b/l  HEART: regular rate and fast rhythm at time of exam, irregularity intermittently on tele  ABDOMEN: Soft, Nontender, Nondistended; Bowel sounds present  NERVOUS SYSTEM: alert and oriented x3  EXTREMITIES:  2+ Peripheral Pulses, No edema  SKIN: warm dry  HEME: no unusual bruising noted  PSYCH: appropriate mood and affect                          8.2    7.39  )-----------( 249      ( 02 Feb 2021 06:22 )             27.7     02-02    140  |  104  |  25<H>  ----------------------------<  77  3.8   |  28  |  0.95    Ca    9.1      02 Feb 2021 06:22  Phos  3.2     02-02  Mg     2.1     02-02    TPro  6.7  /  Alb  2.0<L>  /  TBili  0.5  /  DBili  x   /  AST  15  /  ALT  16  /  AlkPhos  106  02-02    LIVER FUNCTIONS - ( 02 Feb 2021 06:22 )  Alb: 2.0 g/dL / Pro: 6.7 g/dL / ALK PHOS: 106 U/L / ALT: 16 U/L DA / AST: 15 U/L / GGT: x           CARDIAC MARKERS ( 01 Feb 2021 18:33 )  <0.015 ng/mL / x     / x     / x     / x      CARDIAC MARKERS ( 01 Feb 2021 08:46 )  <0.015 ng/mL / x     / x     / x     / x              CAPILLARY BLOOD GLUCOSE      RADIOLOGY & ADDITIONAL TESTS:                   PGY-1 Progress Note discussed with attending    PAGER #: [[phone]] TILL 5:00 PM  PLEASE CONTACT ON CALL TEAM:  - On Call Team (Please refer to Samina) FROM 5:00 PM - 8:30PM  - Nightfloat Team FROM 8:30 -7:30 AM    CHIEF COMPLAINT & BRIEF HOSPITAL COURSE: 80M from home with daughter PMH bladder cancer mets to lungs, sent 1/29 by oncologist Dr. Oviedo for hypotension, noted in ED to have generalized weakness and new-onset AF, confirmed by daughter Opal ([phone]) to have hemoptysis, asp PNA, and increasing confusion recently, DNR/DNI, modified comfort care with medical mgmt such as IVF, abx, imaging, but avoidance of aggressive measures (eg AC). Hospice discussed, but daughter endorses having enough support at home for now. Noted to have HR 120s and SBP 90s in ED. Labs notable for Hb 8.6, Cr 1.36, trop negative x1. EKG with AF RVR. Admitted to tele for management of hypotension and new-onset AF.     INTERVAL HPI/OVERNIGHT EVENTS: NAEON. Continuing to have episodes AF with RVR to 150s, with SBPs low to 86. Unable to increase metoprolol given BP, amio loading. Increased HR may be exacerbated by pain, team receiving conflicting reports from patient about presence/absence of pain. Added morphine 2mg q4 prn severe pain. Rayray Joseph updated today, on board with plan. Palliative following, reports daughter does not feel ready for hospice but wants to discuss it with her family, and endorses wish to have end-of-life care for her father to be done in [place].     MEDICATIONS  (STANDING):  aMIOdarone    Tablet   Oral   aMIOdarone    Tablet 400 milliGRAM(s) Oral every 8 hours  aspirin  chewable 81 milliGRAM(s) Oral daily  cyanocobalamin 1000 MICROGram(s) Oral daily  enoxaparin Injectable 40 milliGRAM(s) SubCutaneous daily  metoprolol tartrate 50 milliGRAM(s) Oral two times a day  morphine  - Injectable 2 milliGRAM(s) IV Push every 4 hours  sodium chloride 1 Gram(s) Oral daily    MEDICATIONS  (PRN):  acetaminophen   Tablet .. 650 milliGRAM(s) Oral every 6 hours PRN Temp greater or equal to 38C (100.4F), Mild Pain (1 - 3)    REVIEW OF SYSTEMS:  CONSTITUTIONAL: No fever or fatigue, feels well  RESPIRATORY: No cough, No shortness of breath  CARDIOVASCULAR: resolved chest pain, no palpitations, dizziness, or leg swelling  GASTROINTESTINAL: No abdominal pain. No nausea, vomiting; No diarrhea or constipation.   GENITOURINARY: No dysuria or hematuria, urinary frequency  NEUROLOGICAL: No headache  SKIN: No itching or rashes  PSYCH: no issues  MSK: no arthralgias or myalgias  all other systems reviewed and are negative     Vital Signs Last 24 Hrs  T(C): 36.6 (02 Feb 2021 11:17), Max: 36.9 (01 Feb 2021 19:16)  T(F): 97.9 (02 Feb 2021 11:17), Max: 98.5 (01 Feb 2021 19:16)  HR: 138 (02 Feb 2021 11:17) (78 - 138)  BP: 114/85 (02 Feb 2021 11:17) (114/85 - 166/83)  BP(mean): --  RR: 19 (02 Feb 2021 11:17) (17 - 19)  SpO2: 96% (02 Feb 2021 11:17) (96% - 99%)    PHYSICAL EXAMINATION:  GENERAL: thin, lying in bed  HEAD:  Atraumatic, Normocephalic  EYES:  conjunctiva and sclera clear  NECK: Supple  CHEST/LUNG: no increased WOB, good air movement b/l  HEART: regular rate and fast rhythm at time of exam, irregularity intermittently on tele  ABDOMEN: Soft, Nontender, Nondistended; Bowel sounds present  NERVOUS SYSTEM: alert and oriented x3  EXTREMITIES:  2+ Peripheral Pulses, No edema  SKIN: warm dry  HEME: no unusual bruising noted  PSYCH: appropriate mood and affect                          8.2    7.39  )-----------( 249      ( 02 Feb 2021 06:22 )             27.7     02-02    140  |  104  |  25<H>  ----------------------------<  77  3.8   |  28  |  0.95    Ca    9.1      02 Feb 2021 06:22  Phos  3.2     02-02  Mg     2.1     02-02    TPro  6.7  /  Alb  2.0<L>  /  TBili  0.5  /  DBili  x   /  AST  15  /  ALT  16  /  AlkPhos  106  02-02    LIVER FUNCTIONS - ( 02 Feb 2021 06:22 )  Alb: 2.0 g/dL / Pro: 6.7 g/dL / ALK PHOS: 106 U/L / ALT: 16 U/L DA / AST: 15 U/L / GGT: x           CARDIAC MARKERS ( 01 Feb 2021 18:33 )  <0.015 ng/mL / x     / x     / x     / x      CARDIAC MARKERS ( 01 Feb 2021 08:46 )  <0.015 ng/mL / x     / x     / x     / x              CAPILLARY BLOOD GLUCOSE      RADIOLOGY & ADDITIONAL TESTS:

## 2021-02-02 NOTE — CONSULT NOTE ADULT - SUBJECTIVE AND OBJECTIVE BOX
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 Oviedo's office for hypotension. Pt presented to generalized weakness, with new onset A-fib diagnosed in ed.   D/w with Pt's daughter, Opal Abrams, [phone].  She confirms that Pt has been confused recently.  She says that he has been made comfort care, DNR, DNI, and she does want IVF (contrary to the MOLST form), antibioitc and all medical treatment and is ok with ct scans and mri. Treatment as normal pt but no aggresive measures likel blood thinners.  She wants to set up home hospice.  As per daughter Pt went to oncologist office for starting more aggresive treatment for cancer but pt bp was very low. Daughter also reports 2 week ago pt was coughing up blood in clots and then was found to have aspiration pneumonia. Pt sometimes reports his stomach hurts and some times its all over pain. Pt sometimes is there and some times very confused and in past with signs of dementia.     Interval hx: Pt seen ans examined a the bedside, AOX1.  Confused.  NAD.  Palliative care to assess hospice eligibility.        PAST MEDICAL & SURGICAL HISTORY:  Malignant neoplasm of urinary bladder, unspecified site    No significant past surgical history        SOCIAL HISTORY:    Admitted from:  Home with family; Dtr HHA through [facility] 8hours daily  Pt has 6 children, his daughter Opal Abrams is the Primary decision maker    [employer]:    [employer]                                    Surrogate/HCP/Guardian:   Opal Abrams,         Phone#:  [phone]    FAMILY HISTORY:  Pt unable to participate  Baseline ADLs (prior to admission): dependent    Allergies    Hyzaar (Unknown)  losartan (Unknown)    Intolerances      Present Symptoms:    Unable to obtain due to poor mentation    MEDICATIONS  (STANDING):  aspirin  chewable 81 milliGRAM(s) Oral daily  cyanocobalamin 1000 MICROGram(s) Oral daily  enoxaparin Injectable 40 milliGRAM(s) SubCutaneous daily  metoprolol tartrate 50 milliGRAM(s) Oral two times a day  sodium chloride 1 Gram(s) Oral daily    MEDICATIONS  (PRN):  acetaminophen   Tablet .. 650 milliGRAM(s) Oral every 6 hours PRN Temp greater or equal to 38C (100.4F), Mild Pain (1 - 3)      PHYSICAL EXAM:    Vital Signs Last 24 Hrs  T(C): 36.6 (02 Feb 2021 11:17), Max: 36.9 (01 Feb 2021 19:16)  T(F): 97.9 (02 Feb 2021 11:17), Max: 98.5 (01 Feb 2021 19:16)  HR: 138 (02 Feb 2021 11:17) (78 - 138)  BP: 114/85 (02 Feb 2021 11:17) (114/85 - 166/83)  BP(mean): --  RR: 19 (02 Feb 2021 11:17) (17 - 19)  SpO2: 96% (02 Feb 2021 11:17) (96% - 99%)    General: Elderly man AOX1.  Confused.  NAD  Karnofsky Performance Score/Palliative Performance Status Version2: 40    %    HEENT: Bitemporal wasting, clear oropharynx  Lungs: unlabored on RA  CV: S1S2, Tachycardic  GI: soft, non tender on palpation  : incontinent  Musculoskeletal: bedbound  Skin: hyperpigmented b/l LE  Neuro: able to follow simple commands  Oral intake ability: poor po intake      LABS:                        8.2    7.39  )-----------( 249      ( 02 Feb 2021 06:22 )             27.7     02-02    140  |  104  |  25<H>  ----------------------------<  77  3.8   |  28  |  0.95    Ca    9.1      02 Feb 2021 06:22  Phos  3.2     02-02  Mg     2.1     02-02    TPro  6.7  /  Alb  2.0<L>  /  TBili  0.5  /  DBili  x   /  AST  15  /  ALT  16  /  AlkPhos  106  02-02        RADIOLOGY & ADDITIONAL STUDIES: Reviewed    ADVANCE DIRECTIVES: DNR/DNI

## 2021-02-02 NOTE — CONSULT NOTE ADULT - PROBLEM SELECTOR RECOMMENDATION 9
Hx of bladder cancer with mets to lungs.  ECOG 3-4. Pt is DNR/DNI.  Pt is eligible for hospice services.    Pt's daughter Mrs. Abrams is states she is not ready for hospice, however she will discuss with her family.  Palliative care will follow Hx of bladder cancer with mets to lungs.  ECOG 3-4. Pt is DNR/DNI.  Pt is eligible for hospice services.    Pt's daughter Mrs. Abrams states she is not ready for hospice, however she will discuss with her family.  Palliative care will follow

## 2021-02-02 NOTE — CONSULT NOTE ADULT - PROBLEM SELECTOR RECOMMENDATION 2
Pt AOx1, confused at the time of exam.  Bedbound.  Dependent.  Explained the dementia trajectory and that pt is hospice appropriate given his cognitive and functional decline.  Family to discuss hospice.  Palliative care will follow. new a-fib with RVR. AC contraindicated given pt has recent history of gross hematuria from radiation.  Daughter does not blood thinners.     Pt is DNR/DNI new a-fib with RVR. AC contraindicated given pt has recent history of gross hematuria from radiation.  Daughter does not want blood thinners.     Pt is DNR/DNI

## 2021-02-02 NOTE — CONSULT NOTE ADULT - ATTENDING COMMENTS
80 y M w metastatic bladder CA to lungs, ECOG 3-4 80 y M w metastatic bladder CA to lungs, ECOG 4, s/p chemotherapy, immunotherapy, RT to bladder, foll by Dr Preet MICHAEL. Hospice eligible. DNR/DNI on file. Daughter to discuss hospice option w family, does not feel ready for hospice at this time. palliative will follow. Supportive care.

## 2021-02-03 NOTE — PROGRESS NOTE ADULT - NSHPATTENDINGPLANDISCUSS_GEN_ALL_CORE
Patient, Family, SW/CM, Dr. Cano
Patient, Dr. Mclean
Patient, Dr. Mclean
Dr. Mclean, SW/CM
Patient, Dr. Mclean

## 2021-02-03 NOTE — PROGRESS NOTE ADULT - PROBLEM SELECTOR PLAN 1
Patient admitted with new a-fib with RVR that spontaneously converted overnight 1/29-30  -AF with RVR again noted tele 1/31-2/2  -CP 2/1, EKG unremarkable, trops negative   -metoprolol 12.5mg po BID this adm, increase BID dosing to 25mg 1/31, 50mg 2/1  -added amio loading 2/2  -added asa  -may be worsened by pain, added morphine 2mg q4 prn severe pain  -daughter refusing AC as aggressive measure not consistent with patient's GOC, but agreeable with above measures  -encourage po fluid intake  -daughter updated 2/3

## 2021-02-03 NOTE — PROGRESS NOTE ADULT - SUBJECTIVE AND OBJECTIVE BOX
Patient is a 80y old  Male who presents with a chief complaint of low blood pressure and home hospice (02 Feb 2021 15:02)      SUBJECTIVE / OVERNIGHT EVENTS:    ADDITIONAL REVIEW OF SYSTEMS:    MEDICATIONS  (STANDING):  aMIOdarone    Tablet   Oral   aMIOdarone    Tablet 400 milliGRAM(s) Oral every 8 hours  aspirin  chewable 81 milliGRAM(s) Oral daily  cyanocobalamin 1000 MICROGram(s) Oral daily  enoxaparin Injectable 40 milliGRAM(s) SubCutaneous daily  metoprolol tartrate 50 milliGRAM(s) Oral two times a day  sodium chloride 1 Gram(s) Oral daily    MEDICATIONS  (PRN):  acetaminophen   Tablet .. 650 milliGRAM(s) Oral every 6 hours PRN Temp greater or equal to 38C (100.4F), Mild Pain (1 - 3)  morphine  - Injectable 2 milliGRAM(s) IV Push every 4 hours PRN Severe Pain (7 - 10)    Vital Signs Last 24 Hrs  T(C): 36.6 (03 Feb 2021 07:05), Max: 38.1 (02 Feb 2021 19:45)  T(F): 97.9 (03 Feb 2021 07:05), Max: 100.6 (02 Feb 2021 19:45)  HR: 71 (03 Feb 2021 07:05) (71 - 138)  BP: 103/55 (03 Feb 2021 07:05) (103/55 - 114/85)  BP(mean): --  RR: 18 (03 Feb 2021 07:05) (18 - 19)  SpO2: 98% (03 Feb 2021 07:05) (96% - 100%)      LABS:                        7.3    10.72 )-----------( 250      ( 03 Feb 2021 08:37 )             25.6     02-03    142  |  109<H>  |  36<H>  ----------------------------<  100<H>  4.5   |  26  |  1.43<H>    Ca    8.6      03 Feb 2021 08:37  Phos  4.1     02-03  Mg     2.2     02-03    TPro  6.4  /  Alb  1.8<L>  /  TBili  0.4  /  DBili  x   /  AST  21  /  ALT  14  /  AlkPhos  94  02-03      CARDIAC MARKERS ( 01 Feb 2021 18:33 )  <0.015 ng/mL / x     / x     / x     / x              COVID-19 PCR: NotDetec (29 Jan 2021 16:23)      RADIOLOGY & ADDITIONAL TESTS:  Imaging from Last 24 Hours:    Electrocardiogram/QTc Interval:    COORDINATION OF CARE:  Care Discussed with Consultants/Other Providers:   Patient is a 80y old  Male who presents with a chief complaint of low blood pressure and home hospice (02 Feb 2021 15:02)      SUBJECTIVE / OVERNIGHT EVENTS: events noted. Lethargic. Not arousable with verbal or physical stimuli.      MEDICATIONS  (STANDING):  aMIOdarone    Tablet   Oral   aMIOdarone    Tablet 400 milliGRAM(s) Oral every 8 hours  aspirin  chewable 81 milliGRAM(s) Oral daily  cyanocobalamin 1000 MICROGram(s) Oral daily  enoxaparin Injectable 40 milliGRAM(s) SubCutaneous daily  metoprolol tartrate 50 milliGRAM(s) Oral two times a day  sodium chloride 1 Gram(s) Oral daily    MEDICATIONS  (PRN):  acetaminophen   Tablet .. 650 milliGRAM(s) Oral every 6 hours PRN Temp greater or equal to 38C (100.4F), Mild Pain (1 - 3)  morphine  - Injectable 2 milliGRAM(s) IV Push every 4 hours PRN Severe Pain (7 - 10)    Vital Signs Last 24 Hrs  T(C): 36.6 (03 Feb 2021 07:05), Max: 38.1 (02 Feb 2021 19:45)  T(F): 97.9 (03 Feb 2021 07:05), Max: 100.6 (02 Feb 2021 19:45)  HR: 71 (03 Feb 2021 07:05) (71 - 138)  BP: 103/55 (03 Feb 2021 07:05) (103/55 - 114/85)  BP(mean): --  RR: 18 (03 Feb 2021 07:05) (18 - 19)  SpO2: 98% (03 Feb 2021 07:05) (96% - 100%)    PHYSICAL EXAM:  GEN: NAD; NC/AT; cachectic  LUNGS: no wheezing; decreased bilateral air entry; no use of accessory muscles for breathing  HEART: IRIR; no M  ABDOMEN: Soft, Nontender, non distended  EXTREMITIES: no cyanosis; no edema; warm and dry  NERVOUS SYSTEM:  Asleep, does not respond to verbal or physical stimuli    LABS:                        7.3    10.72 )-----------( 250      ( 03 Feb 2021 08:37 )             25.6     02-03    142  |  109<H>  |  36<H>  ----------------------------<  100<H>  4.5   |  26  |  1.43<H>    Ca    8.6      03 Feb 2021 08:37  Phos  4.1     02-03  Mg     2.2     02-03    TPro  6.4  /  Alb  1.8<L>  /  TBili  0.4  /  DBili  x   /  AST  21  /  ALT  14  /  AlkPhos  94  02-03      CARDIAC MARKERS ( 01 Feb 2021 18:33 )  <0.015 ng/mL / x     / x     / x     / x              COVID-19 PCR: NotDetec (29 Jan 2021 16:23)

## 2021-02-03 NOTE — ADVANCED PRACTICE NURSE CONSULT - ASSESSMENT
This is a 80yr old male patient admitted for A. Fib, to which upon assessment, the patients skin is intact without pressure injury

## 2021-02-03 NOTE — PROGRESS NOTE ADULT - SUBJECTIVE AND OBJECTIVE BOX
PGY-1 Progress Note discussed with attending    PAGER #: [[phone]] TILL 5:00 PM  PLEASE CONTACT ON CALL TEAM:  - On Call Team (Please refer to Samina) FROM 5:00 PM - 8:30PM  - Nightfloat Team FROM 8:30 -7:30 AM    CHIEF COMPLAINT & BRIEF HOSPITAL COURSE:  80M from home with daughter PMH bladder cancer mets to lungs, sent 1/29 by oncologist Dr. Oviedo for hypotension, noted in ED to have generalized weakness and new-onset AF, confirmed by daughter Opal ([phone]) to have hemoptysis, asp PNA, and increasing confusion recently, DNR/DNI, modified comfort care with medical mgmt such as IVF, abx, imaging, but avoidance of aggressive measures (eg AC). Hospice discussed, but daughter endorses having enough support at home for now. Noted to have HR 120s and SBP 90s in ED. Labs notable for Hb 8.6, Cr 1.36, trop negative x1. EKG with AF RVR. Admitted to tele for management of hypotension and new-onset AF.     INTERVAL HPI/OVERNIGHT EVENTS: Noted to have new fever to 100.6F overnight, sent RVP, BCx, UCx. +UA, CXR with mass likely from known mets, no new infection noted. Started empiric vanc and Zosyn and consulted ID Dr. Gomez. HR improving, continuing day 2 amiodarone. Updated daughter Opal today, agreeable with plan, although eager to bring her father home. Patient seen and examined at bedside, remains responsive to voice and questions, with some confusion. Continuing morphine prn pain.     MEDICATIONS  (STANDING):  aMIOdarone    Tablet   Oral   aMIOdarone    Tablet 400 milliGRAM(s) Oral every 8 hours  aspirin  chewable 81 milliGRAM(s) Oral daily  cyanocobalamin 1000 MICROGram(s) Oral daily  enoxaparin Injectable 40 milliGRAM(s) SubCutaneous daily  metoprolol tartrate 50 milliGRAM(s) Oral two times a day  piperacillin/tazobactam IVPB.. 3.375 Gram(s) IV Intermittent every 8 hours  sodium chloride 1 Gram(s) Oral daily  sodium chloride 0.9%. 1000 milliLiter(s) (1000 mL/Hr) IV Continuous <Continuous>  vancomycin  IVPB      vancomycin  IVPB 1000 milliGRAM(s) IV Intermittent every 12 hours    MEDICATIONS  (PRN):  acetaminophen   Tablet .. 650 milliGRAM(s) Oral every 6 hours PRN Temp greater or equal to 38C (100.4F), Mild Pain (1 - 3)  morphine  - Injectable 2 milliGRAM(s) IV Push every 4 hours PRN Severe Pain (7 - 10)    REVIEW OF SYSTEMS:  CONSTITUTIONAL: No fever or fatigue  RESPIRATORY: No cough, No shortness of breath  CARDIOVASCULAR: resolved chest pain, no palpitations, dizziness, or leg swelling  GASTROINTESTINAL: No abdominal pain. No nausea, vomiting; No diarrhea or constipation.   GENITOURINARY: No dysuria or hematuria, urinary frequency  NEUROLOGICAL: No headache  SKIN: No itching or rashes  PSYCH: no issues  MSK: no arthralgias or myalgias  all other systems reviewed and are negative     Vital Signs Last 24 Hrs  T(C): 36.6 (03 Feb 2021 11:09), Max: 38.1 (02 Feb 2021 19:45)  T(F): 97.9 (03 Feb 2021 11:09), Max: 100.6 (02 Feb 2021 19:45)  HR: 71 (03 Feb 2021 11:09) (71 - 106)  BP: 110/57 (03 Feb 2021 11:09) (103/55 - 111/61)  BP(mean): --  RR: 18 (03 Feb 2021 11:09) (18 - 19)  SpO2: 99% (03 Feb 2021 11:09) (96% - 100%)    PHYSICAL EXAMINATION:  GENERAL: thin, lying in bed  HEAD:  Atraumatic, Normocephalic  EYES:  conjunctiva and sclera clear  NECK: Supple  CHEST/LUNG: no increased WOB, good air movement b/l  HEART: regular rate and fast rhythm at time of exam, irregularity intermittently on tele  ABDOMEN: Soft, Nontender, Nondistended; Bowel sounds present  NERVOUS SYSTEM: alert and oriented x2, lethargic  EXTREMITIES:  2+ Peripheral Pulses, No edema  SKIN: warm dry  HEME: no unusual bruising noted  PSYCH: appropriate mood and affect, albeit lethargic                            7.3    10.72 )-----------( 250      ( 03 Feb 2021 08:37 )             25.6     02-03    142  |  109<H>  |  36<H>  ----------------------------<  100<H>  4.5   |  26  |  1.43<H>    Ca    8.6      03 Feb 2021 08:37  Phos  4.1     02-03  Mg     2.2     02-03    TPro  6.4  /  Alb  1.8<L>  /  TBili  0.4  /  DBili  x   /  AST  21  /  ALT  14  /  AlkPhos  94  02-03    LIVER FUNCTIONS - ( 03 Feb 2021 08:37 )  Alb: 1.8 g/dL / Pro: 6.4 g/dL / ALK PHOS: 94 U/L / ALT: 14 U/L DA / AST: 21 U/L / GGT: x           CARDIAC MARKERS ( 01 Feb 2021 18:33 )  <0.015 ng/mL / x     / x     / x     / x              CAPILLARY BLOOD GLUCOSE      RADIOLOGY & ADDITIONAL TESTS:

## 2021-02-03 NOTE — PROGRESS NOTE ADULT - ASSESSMENT
complete note to follow   Assessment and Recommendation:   · Assessment	  Problem #1 Bladder Ca - advanced w/ lung mets s/p palliative chemotherapy and immunotherapy and RT to bladder  ECOG PS 4  -AMS, lethargic, cachectic, not arousable to physical stimuli  -not a candidate for further palliative chemotherapy at this time  -pt is an appropriate candidate for home hospice, await family decision    Problem #2 ADAM/RVR - medical management; AC contraindicated given pt has recent history of gross hematuria from radiation effects as well as the tumor    Problem #3 Anemia - multifactorial; no need for PRBC unless hg < 7 g/dL  -avoid non-essential daily phlebotomy    DNR/DNI  Thank you for the referral. Will continue to monitor the patient.  Please call with any questions [phone]  Above reviewed with Attending Dr. Robison

## 2021-02-04 NOTE — PROGRESS NOTE ADULT - ASSESSMENT
79 yo M with stage IV bladder CA with lung mets c/b hematuria and hemoptysis, hx DVT (off AC 2/2 bleeding and s/p IVC filter) admitted to tele for management of hypotension and new-onset AF with RVR, likely 2/2 dehydration and poor po intake, found to have +UA and +COVID 2/3.

## 2021-02-04 NOTE — CONSULT NOTE ADULT - ASSESSMENT
Fever - resolved Fever - resolved  UTI  COVID - 19 infection    Plan - DC Vanco and Zosyn  Start Unasyn 1.5 gms iv q6 hrs  Cont Decadron 6 mgs iv q24 hrs.  prognosis is very poor

## 2021-02-04 NOTE — CONSULT NOTE ADULT - SUBJECTIVE AND OBJECTIVE BOX
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 Oviedo's office for hypotension. Pt presented to generalized weakness, with new onset A-fib diagnosed in ed.   D/w with Pt's daughter, Opal Abrams, [phone].  She confirms that Pt has been confused recently.  She says that he has been made comfort care, DNR, DNI, and she does want IVF (contrary to the MOLST form), antibioitc and all medical treatment and is ok with ct scans and mri. Treatment as normal pt but no aggresive measures likel blood thinners.  She wants to set up home hospice.  As per daughter Pt went to oncologist office for starting more aggresive treatment for cancer but pt bp was very low. Daughter also reports 2 week ago pt was coughing up blood in clots and then was found to have aspiration pneumonia. Pt sometimes reports his stomach hurts and some times its all over pain. Pt sometimes is there and some times very confused and in past with signs of dementia.   GOC : dnr/dni/ comfort measures with medical treatment. (2021 18:08)      PAST MEDICAL & SURGICAL HISTORY:  Malignant neoplasm of urinary bladder, unspecified site    No significant past surgical history        Hyzaar (Unknown)  losartan (Unknown)      Meds:  acetaminophen   Tablet .. 650 milliGRAM(s) Oral every 6 hours PRN  ALBUTerol    90 MICROgram(s) HFA Inhaler 2 Puff(s) Inhalation every 6 hours PRN  aMIOdarone    Tablet   Oral   aMIOdarone    Tablet 400 milliGRAM(s) Oral every 8 hours  aspirin  chewable 81 milliGRAM(s) Oral daily  cyanocobalamin 1000 MICROGram(s) Oral daily  dexAMETHasone  Injectable 6 milliGRAM(s) IV Push daily  enoxaparin Injectable 40 milliGRAM(s) SubCutaneous daily  guaiFENesin   Syrup  (Sugar-Free) 100 milliGRAM(s) Oral every 6 hours PRN  metoprolol tartrate 50 milliGRAM(s) Oral two times a day  morphine  - Injectable 2 milliGRAM(s) IV Push every 4 hours PRN  piperacillin/tazobactam IVPB.. 3.375 Gram(s) IV Intermittent every 8 hours  sodium chloride 1 Gram(s) Oral daily  sodium chloride 0.9%. 1000 milliLiter(s) IV Continuous <Continuous>  vancomycin  IVPB      vancomycin  IVPB 1000 milliGRAM(s) IV Intermittent every 12 hours      SOCIAL HISTORY:  Smoker:  YES / NO        PACK YEARS:                         WHEN QUIT?  ETOH use:  YES / NO               FREQUENCY / QUANTITY:  Ilicit Drug use:  YES / NO  Occupation:  Assisted device use (Cane / Walker):  Live with:    FAMILY HISTORY:      VITALS:  Vital Signs Last 24 Hrs  T(C): 37.3 (2021 16:22), Max: 37.3 (2021 04:56)  T(F): 99.1 (2021 16:22), Max: 99.2 (2021 04:56)  HR: 69 (2021 16:22) (67 - 71)  BP: 127/69 (2021 16:22) (110/63 - 134/70)  BP(mean): --  RR: 18 (2021 16:22) (18 - 20)  SpO2: 97% (2021 16:22) (95% - 100%)    LABS/DIAGNOSTIC TESTS:                          7.2    6.88  )-----------( 211      ( 2021 07:13 )             24.9     WBC Count: 6.88 K/uL ( @ 07:13)  WBC Count: 10.72 K/uL ( @ 08:37)  WBC Count: 7.39 K/uL ( @ 06:22)      -    142  |  109<H>  |  31<H>  ----------------------------<  111<H>  3.9   |  23  |  1.20    Ca    8.1<L>      2021 07:13  Phos  2.7     -  Mg     2.1     -    TPro  6.2  /  Alb  1.7<L>  /  TBili  0.5  /  DBili  x   /  AST  12  /  ALT  11  /  AlkPhos  97  02-04      Urinalysis Basic - ( 2021 12:20 )    Color: Yellow / Appearance: very cloudy / S.025 / pH: x  Gluc: x / Ketone: Small  / Bili: Negative / Urobili: Negative   Blood: x / Protein: 100 / Nitrite: Negative   Leuk Esterase: Moderate / RBC: Negative /HPF / WBC >50 /HPF   Sq Epi: x / Non Sq Epi: Few /HPF / Bacteria: Many /HPF        LIVER FUNCTIONS - ( 2021 07:13 )  Alb: 1.7 g/dL / Pro: 6.2 g/dL / ALK PHOS: 97 U/L / ALT: 11 U/L DA / AST: 12 U/L / GGT: x                 LACTATE:    ABG -     CULTURES:   .Urine Clean Catch (Midstream)   @ 18:31   >100,000 CFU/ml Enterococcus species  --  --      .Blood Blood-Peripheral   @ 21:42   No Growth Final  --  --          RADIOLOGY:< from: Xray Chest 1 View- PORTABLE-Urgent (Xray Chest 1 View- PORTABLE-Urgent .) (21 @ 09:48) >  EXAM:  XR CHEST PORTABLE URGENT 1V                            PROCEDURE DATE:  2021          INTERPRETATION:  CLINICAL INDICATION: 80 years  Male with new fever. History of metastatic cancer to the lung. Bladder infection.    COMPARISON: None    AP view of the chest demonstrates a an 8.5 x 4.6 cm left hilar/paratracheal mass.    The lung fields are clear. There is no pleural effusion and no pneumothorax.    The heart is enlarged.    The osseous structures are unremarkable.    IMPRESSION:    Large left hilar/paratracheal soft tissue mass. Recommend chest CT.    Findings discussed with Dr. Opal Mclean at 2/3/2021 11:08 AM with readback.            KELECHI VOGEL MD; Attending Radiologist  This document has been electronically signed. Feb  3 2021 11:09AM    < end of copied text >        ROS  [  ] UNABLE TO ELICIT               HPI:  Patient is 80 year old man, from home lives with sree with h/o bladder cancer mets to lungs, sent in from oncologist Dr. Oviedo's office for hypotension. Pt presented to generalized weakness, with new onset A-fib diagnosed in ed.   D/w with Pt's daughter, Opal Abrams, [phone].  She confirms that Pt has been confused recently.  She says that he has been made comfort care, DNR, DNI, and she does want IVF (contrary to the MOLST form), antibioitc and all medical treatment and is ok with ct scans and mri. Treatment as normal pt but no aggresive measures likel blood thinners.  She wants to set up home hospice.  As per daughter Pt went to oncologist office for starting more aggresive treatment for cancer but pt bp was very low. Daughter also reports 2 week ago pt was coughing up blood in clots and then was found to have aspiration pneumonia. Pt sometimes reports his stomach hurts and some times its all over pain. Pt sometimes is there and some times very confused and in past with signs of dementia.   GOC : dnr/dni/ comfort measures with medical treatment. (2021 18:08)      History as above, asked to see patient as he  was found to have a fever and UTI which is now growing out Enterococcus, the pt also is COVID -19 +. He has metastatic bladder ca and is on comfort care, the patient is awake though a little lethargic but confused and answers no to every question that I pose to him. He has no fevers currently but did on       PAST MEDICAL & SURGICAL HISTORY:  Malignant neoplasm of urinary bladder, unspecified site    No significant past surgical history        Hyzaar (Unknown)  losartan (Unknown)      Meds:  acetaminophen   Tablet .. 650 milliGRAM(s) Oral every 6 hours PRN  ALBUTerol    90 MICROgram(s) HFA Inhaler 2 Puff(s) Inhalation every 6 hours PRN  aMIOdarone    Tablet   Oral   aMIOdarone    Tablet 400 milliGRAM(s) Oral every 8 hours  aspirin  chewable 81 milliGRAM(s) Oral daily  cyanocobalamin 1000 MICROGram(s) Oral daily  dexAMETHasone  Injectable 6 milliGRAM(s) IV Push daily  enoxaparin Injectable 40 milliGRAM(s) SubCutaneous daily  guaiFENesin   Syrup  (Sugar-Free) 100 milliGRAM(s) Oral every 6 hours PRN  metoprolol tartrate 50 milliGRAM(s) Oral two times a day  morphine  - Injectable 2 milliGRAM(s) IV Push every 4 hours PRN  piperacillin/tazobactam IVPB.. 3.375 Gram(s) IV Intermittent every 8 hours  sodium chloride 1 Gram(s) Oral daily  sodium chloride 0.9%. 1000 milliLiter(s) IV Continuous <Continuous>  vancomycin  IVPB      vancomycin  IVPB 1000 milliGRAM(s) IV Intermittent every 12 hours      SOCIAL HISTORY: unknown      FAMILY HISTORY: unknown      VITALS:  Vital Signs Last 24 Hrs  T(C): 37.3 (2021 16:22), Max: 37.3 (2021 04:56)  T(F): 99.1 (2021 16:22), Max: 99.2 (2021 04:56)  HR: 69 (2021 16:22) (67 - 71)  BP: 127/69 (2021 16:22) (110/63 - 134/70)  BP(mean): --  RR: 18 (2021 16:22) (18 - 20)  SpO2: 97% (2021 16:22) (95% - 100%)    LABS/DIAGNOSTIC TESTS:                          7.2    6.88  )-----------( 211      ( 2021 07:13 )             24.9     WBC Count: 6.88 K/uL ( @ 07:13)  WBC Count: 10.72 K/uL ( @ 08:37)  WBC Count: 7.39 K/uL ( @ 06:22)          142  |  109<H>  |  31<H>  ----------------------------<  111<H>  3.9   |  23  |  1.20    Ca    8.1<L>      2021 07:13  Phos  2.7     -  Mg     2.1         TPro  6.2  /  Alb  1.7<L>  /  TBili  0.5  /  DBili  x   /  AST  12  /  ALT  11  /  AlkPhos  97        Urinalysis Basic - ( 2021 12:20 )    Color: Yellow / Appearance: very cloudy / S.025 / pH: x  Gluc: x / Ketone: Small  / Bili: Negative / Urobili: Negative   Blood: x / Protein: 100 / Nitrite: Negative   Leuk Esterase: Moderate / RBC: Negative /HPF / WBC >50 /HPF   Sq Epi: x / Non Sq Epi: Few /HPF / Bacteria: Many /HPF        LIVER FUNCTIONS - ( 2021 07:13 )  Alb: 1.7 g/dL / Pro: 6.2 g/dL / ALK PHOS: 97 U/L / ALT: 11 U/L DA / AST: 12 U/L / GGT: x                 LACTATE:    ABG -     CULTURES:   .Urine Clean Catch (Midstream)   @ 18:31   >100,000 CFU/ml Enterococcus species  --  --      .Blood Blood-Peripheral   @ 21:42   No Growth Final  --  --          RADIOLOGY:< from: Xray Chest 1 View- PORTABLE-Urgent (Xray Chest 1 View- PORTABLE-Urgent .) (21 @ 09:48) >  EXAM:  XR CHEST PORTABLE URGENT 1V                            PROCEDURE DATE:  2021          INTERPRETATION:  CLINICAL INDICATION: 80 years  Male with new fever. History of metastatic cancer to the lung. Bladder infection.    COMPARISON: None    AP view of the chest demonstrates a an 8.5 x 4.6 cm left hilar/paratracheal mass.    The lung fields are clear. There is no pleural effusion and no pneumothorax.    The heart is enlarged.    The osseous structures are unremarkable.    IMPRESSION:    Large left hilar/paratracheal soft tissue mass. Recommend chest CT.    Findings discussed with Dr. Opal Mclean at 2/3/2021 11:08 AM with readback.            KELECHI VOGEL MD; Attending Radiologist  This document has been electronically signed. Feb  3 2021 11:09AM    < end of copied text >        ROS  [ x ] UNABLE TO ELICIT               HPI:  Patient is 80 year old man, from home lives with sree with h/o bladder cancer mets to lungs, sent in from oncologist Dr. Oviedo's office for hypotension. Pt presented to generalized weakness, with new onset A-fib diagnosed in ed.   D/w with Pt's daughter, Opal Abrams, [phone].  She confirms that Pt has been confused recently.  She says that he has been made comfort care, DNR, DNI, and she does want IVF (contrary to the MOLST form), antibioitc and all medical treatment and is ok with ct scans and mri. Treatment as normal pt but no aggresive measures likel blood thinners.  She wants to set up home hospice.  As per daughter Pt went to oncologist office for starting more aggresive treatment for cancer but pt bp was very low. Daughter also reports 2 week ago pt was coughing up blood in clots and then was found to have aspiration pneumonia. Pt sometimes reports his stomach hurts and some times its all over pain. Pt sometimes is there and some times very confused and in past with signs of dementia.   GOC : dnr/dni/ comfort measures with medical treatment. (2021 18:08)      History as above, asked to see patient as he  was found to have a fever and UTI which is now growing out Enterococcus, the pt also is COVID -19 +. He has metastatic bladder ca and is on comfort care, the patient is awake though a little lethargic but confused and answers no to every question that I pose to him. He has no fevers currently but did on 21.      PAST MEDICAL & SURGICAL HISTORY:  Malignant neoplasm of urinary bladder, unspecified site    No significant past surgical history        Hyzaar (Unknown)  losartan (Unknown)      Meds:  acetaminophen   Tablet .. 650 milliGRAM(s) Oral every 6 hours PRN  ALBUTerol    90 MICROgram(s) HFA Inhaler 2 Puff(s) Inhalation every 6 hours PRN  aMIOdarone    Tablet   Oral   aMIOdarone    Tablet 400 milliGRAM(s) Oral every 8 hours  aspirin  chewable 81 milliGRAM(s) Oral daily  cyanocobalamin 1000 MICROGram(s) Oral daily  dexAMETHasone  Injectable 6 milliGRAM(s) IV Push daily  enoxaparin Injectable 40 milliGRAM(s) SubCutaneous daily  guaiFENesin   Syrup  (Sugar-Free) 100 milliGRAM(s) Oral every 6 hours PRN  metoprolol tartrate 50 milliGRAM(s) Oral two times a day  morphine  - Injectable 2 milliGRAM(s) IV Push every 4 hours PRN  piperacillin/tazobactam IVPB.. 3.375 Gram(s) IV Intermittent every 8 hours  sodium chloride 1 Gram(s) Oral daily  sodium chloride 0.9%. 1000 milliLiter(s) IV Continuous <Continuous>  vancomycin  IVPB      vancomycin  IVPB 1000 milliGRAM(s) IV Intermittent every 12 hours      SOCIAL HISTORY: unknown      FAMILY HISTORY: unknown      VITALS:  Vital Signs Last 24 Hrs  T(C): 37.3 (2021 16:22), Max: 37.3 (2021 04:56)  T(F): 99.1 (2021 16:22), Max: 99.2 (2021 04:56)  HR: 69 (2021 16:22) (67 - 71)  BP: 127/69 (2021 16:22) (110/63 - 134/70)  BP(mean): --  RR: 18 (2021 16:22) (18 - 20)  SpO2: 97% (2021 16:22) (95% - 100%)    LABS/DIAGNOSTIC TESTS:                          7.2    6.88  )-----------( 211      ( 2021 07:13 )             24.9     WBC Count: 6.88 K/uL ( @ 07:13)  WBC Count: 10.72 K/uL ( @ 08:37)  WBC Count: 7.39 K/uL ( @ 06:22)      -    142  |  109<H>  |  31<H>  ----------------------------<  111<H>  3.9   |  23  |  1.20    Ca    8.1<L>      2021 07:13  Phos  2.7     -  Mg     2.1     -    TPro  6.2  /  Alb  1.7<L>  /  TBili  0.5  /  DBili  x   /  AST  12  /  ALT  11  /  AlkPhos  97  -04      Urinalysis Basic - ( 2021 12:20 )    Color: Yellow / Appearance: very cloudy / S.025 / pH: x  Gluc: x / Ketone: Small  / Bili: Negative / Urobili: Negative   Blood: x / Protein: 100 / Nitrite: Negative   Leuk Esterase: Moderate / RBC: Negative /HPF / WBC >50 /HPF   Sq Epi: x / Non Sq Epi: Few /HPF / Bacteria: Many /HPF        LIVER FUNCTIONS - ( 2021 07:13 )  Alb: 1.7 g/dL / Pro: 6.2 g/dL / ALK PHOS: 97 U/L / ALT: 11 U/L DA / AST: 12 U/L / GGT: x             Respiratory Viral Panel with COVID-19 by SUSANNE (21 @ 14:48)   Rapid RVP Result: Detected   SARS-CoV-2: Detected: This Respiratory Panel uses polymerase chain reaction (PCR) to detect for   adenovirus; coronavirus (HKU1, NL63, 229E, OC43); human metapneumovirus   (hMPV); human enterovirus/rhinovirus (Entero/RV); influenza A; influenza   A/H1; influenza A/H3; influenza A/H1-2009; influenza B; parainfluenza   viruses 1, 2, 3, 4; respiratory syncytial virus; Mycoplasma pneumoniae;   Chlamydophila pneumoniae; and SARS-CoV-2.     LACTATE:    ABG -     CULTURES:   .Urine Clean Catch (Midstream)   @ 18:31   >100,000 CFU/ml Enterococcus species  --  --      .Blood Blood-Peripheral   @ 21:42   No Growth Final  --  --          RADIOLOGY:< from: Xray Chest 1 View- PORTABLE-Urgent (Xray Chest 1 View- PORTABLE-Urgent .) (21 @ 09:48) >  EXAM:  XR CHEST PORTABLE URGENT 1V                            PROCEDURE DATE:  2021          INTERPRETATION:  CLINICAL INDICATION: 80 years  Male with new fever. History of metastatic cancer to the lung. Bladder infection.    COMPARISON: None    AP view of the chest demonstrates a an 8.5 x 4.6 cm left hilar/paratracheal mass.    The lung fields are clear. There is no pleural effusion and no pneumothorax.    The heart is enlarged.    The osseous structures are unremarkable.    IMPRESSION:    Large left hilar/paratracheal soft tissue mass. Recommend chest CT.    Findings discussed with Dr. Opal Mclean at 2/3/2021 11:08 AM with readback.            KELECHI VOGEL MD; Attending Radiologist  This document has been electronically signed. Feb  3 2021 11:09AM    < end of copied text >        ROS  [ x ] UNABLE TO ELICIT

## 2021-02-04 NOTE — PROGRESS NOTE ADULT - PROBLEM SELECTOR PLAN 3
-febrile 100.6F 2/2, septic workup sent, +COVID 2/3  -CXR unremarkable for infx, not requiring O2 at this time  -started Decadron empirically 2/4, hold off remdesivir given variable Cr and lack of O2 requirement  -supportive care with prn Tylenol, albuterol, robitussin, and incentive spirometry as tolerated  -isolation for x10 days from 2/3, daughter informed 2/4am by team attending -febrile 100.6F 2/2, septic workup sent, +COVID 2/3  -CXR unremarkable for infx, not requiring O2 at this time  -started Decadron empirically 2/4, hold off remdesivir given variable Cr and lack of O2 requirement  -trend APRs, including d-dimer  -supportive care with prn Tylenol, albuterol, robitussin, and incentive spirometry as tolerated  -isolation for x10 days from 2/3, daughter informed 2/4am by team attending

## 2021-02-04 NOTE — CONSULT NOTE ADULT - SUBJECTIVE AND OBJECTIVE BOX
HISTORY OF PRESENT ILLNESS: HPI:  Patient is 80 year old man, from home lives with sree with h/o bladder cancer mets to lungs, sent in from oncologist Dr. Oviedo's office for hypotension. Pt presented to generalized weakness, with new onset A-fib diagnosed in ed.   D/w with Pt's daughter, Opal Abrams, [phone].  She confirms that Pt has been confused recently.  She says that he has been made comfort care, DNR, DNI, and she does want IVF (contrary to the MOLST form), antibioitc and all medical treatment and is ok with ct scans and mri. Treatment as normal pt but no aggresive measures likel blood thinners.  She wants to set up home hospice.  As per daughter Pt went to oncologist office for starting more aggresive treatment for cancer but pt bp was very low. Daughter also reports 2 week ago pt was coughing up blood in clots and then was found to have aspiration pneumonia. Pt sometimes reports his stomach hurts and some times its all over pain. Pt sometimes is there and some times very confused and in past with signs of dementia.   Cardiology is asked to help with rapid paroxysmal afib.  He appears to have sponteneously converted this AM  GOC : dnr/dni/ comfort measures with medical treatment. (29 Jan 2021 18:08)      PAST MEDICAL & SURGICAL HISTORY:  Malignant neoplasm of urinary bladder, unspecified site    No significant past surgical history            MEDICATIONS:  MEDICATIONS  (STANDING):  aMIOdarone    Tablet   Oral   aMIOdarone    Tablet 400 milliGRAM(s) Oral every 8 hours  aspirin  chewable 81 milliGRAM(s) Oral daily  cyanocobalamin 1000 MICROGram(s) Oral daily  enoxaparin Injectable 40 milliGRAM(s) SubCutaneous daily  metoprolol tartrate 50 milliGRAM(s) Oral two times a day  piperacillin/tazobactam IVPB.. 3.375 Gram(s) IV Intermittent every 8 hours  sodium chloride 1 Gram(s) Oral daily  sodium chloride 0.9%. 1000 milliLiter(s) (1000 mL/Hr) IV Continuous <Continuous>  vancomycin  IVPB      vancomycin  IVPB 1000 milliGRAM(s) IV Intermittent every 12 hours      Allergies    Hyzaar (Unknown)  losartan (Unknown)    Intolerances        FAMILY HISTORY:    Non-contributary for premature coronary disease or sudden cardiac death    SOCIAL HISTORY:    [X ] Non-smoker  [ ] Smoker  [ ] Alcohol        REVIEW OF SYSTEMS:  [ ]chest pain  [  ]shortness of breath  [  ]palpitations  [  ]syncope  [ ]near syncope [ ]upper extremity weakness   [ ] lower extremity weakness  [  ]diplopia  [  ]altered mental status   [  ]fevers  [ ]chills [ ]nausea  [ ]vomitting  [  ]dysphagia    [ ]abdominal pain  [ ]melena  [ ]BRBPR    [  ]epistaxis  [  ]rash    [ ]lower extremity edema        [ ] All others negative	  [ ] Unable to obtain      LABS:	 	    CARDIAC MARKERS:  CARDIAC MARKERS ( 01 Feb 2021 18:33 )  <0.015 ng/mL / x     / x     / x     / x                                  7.2    6.88  )-----------( 211      ( 04 Feb 2021 07:13 )             24.9     Hb Trend: 7.2<--    02-04    142  |  109<H>  |  31<H>  ----------------------------<  111<H>  3.9   |  23  |  1.20    Ca    8.1<L>      04 Feb 2021 07:13  Phos  2.7     02-04  Mg     2.1     02-04    TPro  6.2  /  Alb  1.7<L>  /  TBili  0.5  /  DBili  x   /  AST  12  /  ALT  11  /  AlkPhos  97  02-04    Creatinine Trend: 1.20<--, 1.43<--, 0.95<--, 0.99<--, 0.96<--, 1.18<--      PHYSICAL EXAM:  T(C): 36.7 (02-04-21 @ 11:25), Max: 37.3 (02-04-21 @ 04:56)  HR: 70 (02-04-21 @ 11:25) (60 - 71)  BP: 129/76 (02-04-21 @ 11:25) (112/52 - 134/70)  RR: 18 (02-04-21 @ 11:25) (17 - 20)  SpO2: 98% (02-04-21 @ 11:25) (95% - 100%)  Wt(kg): --     I&O's Summary    03 Feb 2021 07:01  -  04 Feb 2021 07:00  --------------------------------------------------------  IN: 2000 mL / OUT: 600 mL / NET: 1400 mL      HEENT:  (-)icterus (-)pallor  CV: N S1 S2 1/6 PALMA (+)2 Pulses B/l  Resp:  Clear to ausculatation B/L, normal effort  GI: (+) BS Soft, NT, ND  Lymph:  (-)Edema, (-)obvious lymphadenopathy  Skin: Warm to touch, Normal turgor  Psych: Appropriate mood and affect, confused        TELEMETRY: 	Sinus    ECG:  	Afib 120 BPM, nonspecific T wave abnormality    RADIOLOGY:         CXR:   Large left hilar/paratracheal soft tissue mass. Recommend chest CT.    Findings discussed with Dr. Opal Mclean at 2/3/2021 11:08 AM with readback.        ASSESSMENT/PLAN: 	80y Male metastatic Bladder ca on home hospice admitted with new PAF already spontaneously converted.    - agree with amio load.  Cont 400 TID load to 5 grams  - echo with normal LA size, normal LV fx  - family decline use of blood thinners.  - tx covid per medical team    I once again thank you for allowing me to participate in the care of your patient.  If you have any questions or concerns please do not hesitate to contact me.    Telly Patel MD, [facility]  BEEPER [phone]

## 2021-02-04 NOTE — PROGRESS NOTE ADULT - PROBLEM SELECTOR PLAN 1
Patient admitted with new a-fib with RVR that spontaneously converted overnight 1/29-30  -AF with RVR again noted tele 1/31-2/2  -CP 2/1, EKG unremarkable, trops negative   -metoprolol 12.5mg po BID this adm, increase BID dosing to 25mg 1/31, 50mg 2/1  -added amio loading 2/2  -added asa  -may be worsened by pain, added morphine 2mg q4 prn severe pain  -daughter refusing AC as aggressive measure not consistent with patient's GOC, but agreeable with above measures  -encourage po fluid intake  -cards Dr. Patel following

## 2021-02-04 NOTE — PROGRESS NOTE ADULT - SUBJECTIVE AND OBJECTIVE BOX
PGY-1 Progress Note discussed with attending    PAGER #: [[phone]] TILL 5:00 PM  PLEASE CONTACT ON CALL TEAM:  - On Call Team (Please refer to Samina) FROM 5:00 PM - 8:30PM  - Nightfloat Team FROM 8:30 -7:30 AM    CHIEF COMPLAINT & BRIEF HOSPITAL COURSE:    INTERVAL HPI/OVERNIGHT EVENTS:     MEDICATIONS  (STANDING):  aMIOdarone    Tablet   Oral   aMIOdarone    Tablet 400 milliGRAM(s) Oral every 8 hours  aspirin  chewable 81 milliGRAM(s) Oral daily  cyanocobalamin 1000 MICROGram(s) Oral daily  enoxaparin Injectable 40 milliGRAM(s) SubCutaneous daily  metoprolol tartrate 50 milliGRAM(s) Oral two times a day  piperacillin/tazobactam IVPB.. 3.375 Gram(s) IV Intermittent every 8 hours  sodium chloride 1 Gram(s) Oral daily  sodium chloride 0.9%. 1000 milliLiter(s) (1000 mL/Hr) IV Continuous <Continuous>  vancomycin  IVPB      vancomycin  IVPB 1000 milliGRAM(s) IV Intermittent every 12 hours    MEDICATIONS  (PRN):  acetaminophen   Tablet .. 650 milliGRAM(s) Oral every 6 hours PRN Temp greater or equal to 38C (100.4F), Mild Pain (1 - 3)  morphine  - Injectable 2 milliGRAM(s) IV Push every 4 hours PRN Severe Pain (7 - 10)        REVIEW OF SYSTEMS:  CONSTITUTIONAL: No fever, weight loss, or fatigue  RESPIRATORY: No cough, wheezing, chills or hemoptysis; No shortness of breath  CARDIOVASCULAR: No chest pain, palpitations, dizziness, or leg swelling  GASTROINTESTINAL: No abdominal pain. No nausea, vomiting, or hematemesis; No diarrhea or constipation. No melena or hematochezia.  GENITOURINARY: No dysuria or hematuria, urinary frequency  NEUROLOGICAL: No headaches, memory loss, loss of strength, numbness, or tremors  SKIN: No itching, burning, rashes, or lesions     Vital Signs Last 24 Hrs  T(C): 36.7 (04 Feb 2021 11:25), Max: 37.3 (04 Feb 2021 04:56)  T(F): 98 (04 Feb 2021 11:25), Max: 99.2 (04 Feb 2021 04:56)  HR: 70 (04 Feb 2021 11:25) (60 - 71)  BP: 129/76 (04 Feb 2021 11:25) (112/52 - 134/70)  BP(mean): --  RR: 18 (04 Feb 2021 11:25) (17 - 20)  SpO2: 98% (04 Feb 2021 11:25) (95% - 100%)    PHYSICAL EXAMINATION:  GENERAL: NAD, well built  HEAD:  Atraumatic, Normocephalic  EYES:  conjunctiva and sclera clear  NECK: Supple, No JVD, Normal thyroid  CHEST/LUNG: Clear to auscultation. Clear to percussion bilaterally; No rales, rhonchi, wheezing, or rubs  HEART: Regular rate and rhythm; No murmurs, rubs, or gallops  ABDOMEN: Soft, Nontender, Nondistended; Bowel sounds present  NERVOUS SYSTEM: alert and oriented x3  EXTREMITIES:  2+ Peripheral Pulses, No clubbing, cyanosis, or edema  SKIN: warm dry                          7.2    6.88  )-----------( 211      ( 04 Feb 2021 07:13 )             24.9     02-04    142  |  109<H>  |  31<H>  ----------------------------<  111<H>  3.9   |  23  |  1.20    Ca    8.1<L>      04 Feb 2021 07:13  Phos  2.7     02-04  Mg     2.1     02-04    TPro  6.2  /  Alb  1.7<L>  /  TBili  0.5  /  DBili  x   /  AST  12  /  ALT  11  /  AlkPhos  97  02-04    LIVER FUNCTIONS - ( 04 Feb 2021 07:13 )  Alb: 1.7 g/dL / Pro: 6.2 g/dL / ALK PHOS: 97 U/L / ALT: 11 U/L DA / AST: 12 U/L / GGT: x                   CAPILLARY BLOOD GLUCOSE      RADIOLOGY & ADDITIONAL TESTS:                   PGY-1 Progress Note discussed with attending    PAGER #: [[phone]] TILL 5:00 PM  PLEASE CONTACT ON CALL TEAM:  - On Call Team (Please refer to Samina) FROM 5:00 PM - 8:30PM  - Nightfloat Team FROM 8:30 -7:30 AM    CHIEF COMPLAINT & BRIEF HOSPITAL COURSE: 80M from home with daughter PMH bladder cancer mets to lungs, sent 1/29 by oncologist Dr. Oviedo for hypotension, noted in ED to have generalized weakness and new-onset AF, confirmed by daughter Opal ([phone]) to have hemoptysis, asp PNA, and increasing confusion recently, DNR/DNI, modified comfort care with medical mgmt such as IVF, abx, imaging, but avoidance of aggressive measures (eg AC). Hospice discussed, but daughter endorses having enough support at home for now. Noted to have HR 120s and SBP 90s in ED. Labs notable for Hb 8.6, Cr 1.36, trop negative x1. EKG with AF RVR. Admitted to tele for management of hypotension and new-onset AF.     INTERVAL HPI/OVERNIGHT EVENTS: Found to be +COVID overnight, will require inpatient isolation for 10 days from 2/3. Daughter Opal informed by attending this am. Afebrile overnight, VS wnl. Improved lethargy and confusion today, appeared comfortable on exam and denied any complaints. Will start decadron empirically, no remdesivir at this time given lack of O2 requirement and inconsistent kidney function. Prn albuterol and robitussin added. +UA, awaiting UCx and BCx. Continuing zosyn and vanc from 2/3. ID Dr. Gomez following. Day 3 amio loading, cards Dr. Patel consulted, agrees with plan. No further episodes RVR.     MEDICATIONS  (STANDING):  aMIOdarone    Tablet   Oral   aMIOdarone    Tablet 400 milliGRAM(s) Oral every 8 hours  aspirin  chewable 81 milliGRAM(s) Oral daily  cyanocobalamin 1000 MICROGram(s) Oral daily  enoxaparin Injectable 40 milliGRAM(s) SubCutaneous daily  metoprolol tartrate 50 milliGRAM(s) Oral two times a day  piperacillin/tazobactam IVPB.. 3.375 Gram(s) IV Intermittent every 8 hours  sodium chloride 1 Gram(s) Oral daily  sodium chloride 0.9%. 1000 milliLiter(s) (1000 mL/Hr) IV Continuous <Continuous>  vancomycin  IVPB      vancomycin  IVPB 1000 milliGRAM(s) IV Intermittent every 12 hours    MEDICATIONS  (PRN):  acetaminophen   Tablet .. 650 milliGRAM(s) Oral every 6 hours PRN Temp greater or equal to 38C (100.4F), Mild Pain (1 - 3)  morphine  - Injectable 2 milliGRAM(s) IV Push every 4 hours PRN Severe Pain (7 - 10)    REVIEW OF SYSTEMS:  CONSTITUTIONAL: No fever or fatigue  RESPIRATORY: No cough, no shortness of breath  CARDIOVASCULAR: resolved chest pain, no palpitations, dizziness, or leg swelling  GASTROINTESTINAL: No abdominal pain. No nausea, vomiting; No diarrhea or constipation.   GENITOURINARY: No dysuria or hematuria, urinary frequency  NEUROLOGICAL: No headache  SKIN: No itching or rashes  PSYCH: no issues  MSK: no arthralgias or myalgias  all other systems reviewed and are negative     Vital Signs Last 24 Hrs  T(C): 36.7 (04 Feb 2021 11:25), Max: 37.3 (04 Feb 2021 04:56)  T(F): 98 (04 Feb 2021 11:25), Max: 99.2 (04 Feb 2021 04:56)  HR: 70 (04 Feb 2021 11:25) (60 - 71)  BP: 129/76 (04 Feb 2021 11:25) (112/52 - 134/70)  BP(mean): --  RR: 18 (04 Feb 2021 11:25) (17 - 20)  SpO2: 98% (04 Feb 2021 11:25) (95% - 100%)    PHYSICAL EXAMINATION:  GENERAL: thin, lying in bed  HEAD:  Atraumatic, Normocephalic  EYES:  conjunctiva and sclera clear  NECK: Supple  CHEST/LUNG: no increased WOB, good air movement b/l  HEART: regular rate and fast rhythm at time of exam, irregularity intermittently on tele  ABDOMEN: Soft, Nontender, Nondistended; Bowel sounds present  NERVOUS SYSTEM: alert and oriented x2, lethargic  EXTREMITIES:  2+ Peripheral Pulses, No edema  SKIN: warm dry  HEME: no unusual bruising noted  PSYCH: appropriate mood and affect, improved lethargy                          7.2    6.88  )-----------( 211      ( 04 Feb 2021 07:13 )             24.9     02-04    142  |  109<H>  |  31<H>  ----------------------------<  111<H>  3.9   |  23  |  1.20    Ca    8.1<L>      04 Feb 2021 07:13  Phos  2.7     02-04  Mg     2.1     02-04    TPro  6.2  /  Alb  1.7<L>  /  TBili  0.5  /  DBili  x   /  AST  12  /  ALT  11  /  AlkPhos  97  02-04    LIVER FUNCTIONS - ( 04 Feb 2021 07:13 )  Alb: 1.7 g/dL / Pro: 6.2 g/dL / ALK PHOS: 97 U/L / ALT: 11 U/L DA / AST: 12 U/L / GGT: x                   CAPILLARY BLOOD GLUCOSE      RADIOLOGY & ADDITIONAL TESTS:

## 2021-02-04 NOTE — CONSULT NOTE ADULT - REASON FOR ADMISSION
low blood pressure and home hospice

## 2021-02-05 NOTE — PROGRESS NOTE ADULT - PROBLEM SELECTOR PLAN 1
Patient admitted with new a-fib with RVR that spontaneously converted overnight 1/29-30  -AF with RVR again noted tele 1/31-2/2  -CP 2/1, EKG unremarkable, trops negative   -metoprolol 12.5mg po BID this adm, increase BID dosing to 25mg 1/31, 50mg 2/1  -added amio loading 2/2, maintenance from 2/5  -added asa  -may be worsened by pain, added morphine 2mg q4 prn severe pain  -daughter initially refusing AC, update above  -encourage po fluid intake  -cards Dr. Patel following

## 2021-02-05 NOTE — PROGRESS NOTE ADULT - PROBLEM SELECTOR PROBLEM 1
New onset atrial fibrillation

## 2021-02-05 NOTE — PROGRESS NOTE ADULT - PROBLEM SELECTOR PROBLEM 5
ELENA (acute kidney injury)
Anemia
Hypotension due to hypovolemia
Anemia
ELENA (acute kidney injury)

## 2021-02-05 NOTE — PROGRESS NOTE ADULT - REASON FOR ADMISSION
low blood pressure and home hospice

## 2021-02-05 NOTE — PROGRESS NOTE ADULT - SUBJECTIVE AND OBJECTIVE BOX
PGY-1 Progress Note discussed with attending    PAGER #: [[phone]] TILL 5:00 PM  PLEASE CONTACT ON CALL TEAM:  - On Call Team (Please refer to Samina) FROM 5:00 PM - 8:30PM  - Nightfloat Team FROM 8:30 -7:30 AM    CHIEF COMPLAINT & BRIEF HOSPITAL COURSE: 80M from home with daughter PMH bladder cancer mets to lungs, sent 1/29 by oncologist Dr. Oviedo for hypotension, noted in ED to have generalized weakness and new-onset AF, confirmed by daughter Opal ([phone]) to have hemoptysis, asp PNA, and increasing confusion recently, DNR/DNI, modified comfort care with medical mgmt such as IVF, abx, imaging, but avoidance of aggressive measures (eg AC). Hospice discussed, but daughter endorses having enough support at home for now. Noted to have HR 120s and SBP 90s in ED. Labs notable for Hb 8.6, Cr 1.36, trop negative x1. EKG with AF RVR. Admitted to tele for management of hypotension and new-onset AF.     INTERVAL HPI/OVERNIGHT EVENTS: NAEON. T max 99.1, VS wnl. This am noted to have episode of hemoptysis with       MEDICATIONS  (STANDING):  aMIOdarone    Tablet   Oral   aMIOdarone    Tablet 400 milliGRAM(s) Oral every 8 hours  ampicillin/sulbactam  IVPB 1.5 Gram(s) IV Intermittent every 8 hours  aspirin  chewable 81 milliGRAM(s) Oral daily  cyanocobalamin 1000 MICROGram(s) Oral daily  dexAMETHasone  Injectable 6 milliGRAM(s) IV Push daily  enoxaparin Injectable 40 milliGRAM(s) SubCutaneous daily  metoprolol tartrate 50 milliGRAM(s) Oral two times a day  sodium chloride 1 Gram(s) Oral daily    MEDICATIONS  (PRN):  acetaminophen   Tablet .. 650 milliGRAM(s) Oral every 6 hours PRN Temp greater or equal to 38C (100.4F), Mild Pain (1 - 3)  ALBUTerol    90 MICROgram(s) HFA Inhaler 2 Puff(s) Inhalation every 6 hours PRN Shortness of Breath  glycopyrrolate 1 milliGRAM(s) Oral every 6 hours PRN secretions  guaiFENesin   Syrup  (Sugar-Free) 100 milliGRAM(s) Oral every 6 hours PRN Cough  morphine  - Injectable 2 milliGRAM(s) IV Push every 4 hours PRN Severe Pain (7 - 10)        REVIEW OF SYSTEMS:  CONSTITUTIONAL: No fever, weight loss, or fatigue  RESPIRATORY: No cough, wheezing, chills or hemoptysis; No shortness of breath  CARDIOVASCULAR: No chest pain, palpitations, dizziness, or leg swelling  GASTROINTESTINAL: No abdominal pain. No nausea, vomiting, or hematemesis; No diarrhea or constipation. No melena or hematochezia.  GENITOURINARY: No dysuria or hematuria, urinary frequency  NEUROLOGICAL: No headaches, memory loss, loss of strength, numbness, or tremors  SKIN: No itching, burning, rashes, or lesions     Vital Signs Last 24 Hrs  T(C): 36.5 (05 Feb 2021 11:18), Max: 37.3 (04 Feb 2021 16:22)  T(F): 97.7 (05 Feb 2021 11:18), Max: 99.1 (04 Feb 2021 16:22)  HR: 67 (05 Feb 2021 11:18) (65 - 76)  BP: 138/88 (05 Feb 2021 11:18) (110/63 - 142/76)  BP(mean): --  RR: 19 (05 Feb 2021 11:18) (18 - 19)  SpO2: 94% (05 Feb 2021 11:18) (94% - 98%)    PHYSICAL EXAMINATION:  GENERAL: NAD, well built  HEAD:  Atraumatic, Normocephalic  EYES:  conjunctiva and sclera clear  NECK: Supple, No JVD, Normal thyroid  CHEST/LUNG: Clear to auscultation. Clear to percussion bilaterally; No rales, rhonchi, wheezing, or rubs  HEART: Regular rate and rhythm; No murmurs, rubs, or gallops  ABDOMEN: Soft, Nontender, Nondistended; Bowel sounds present  NERVOUS SYSTEM: alert and oriented x3  EXTREMITIES:  2+ Peripheral Pulses, No clubbing, cyanosis, or edema  SKIN: warm dry                          7.3    6.22  )-----------( 218      ( 05 Feb 2021 06:48 )             25.2     02-05    143  |  109<H>  |  30<H>  ----------------------------<  93  3.8   |  26  |  1.38<H>    Ca    8.6      05 Feb 2021 06:48  Phos  3.5     02-05  Mg     2.2     02-05    TPro  6.4  /  Alb  1.7<L>  /  TBili  0.5  /  DBili  x   /  AST  15  /  ALT  12  /  AlkPhos  115  02-05    LIVER FUNCTIONS - ( 05 Feb 2021 06:48 )  Alb: 1.7 g/dL / Pro: 6.4 g/dL / ALK PHOS: 115 U/L / ALT: 12 U/L DA / AST: 15 U/L / GGT: x                   CAPILLARY BLOOD GLUCOSE      RADIOLOGY & ADDITIONAL TESTS:                   PGY-1 Progress Note discussed with attending    PAGER #: [[phone]] TILL 5:00 PM  PLEASE CONTACT ON CALL TEAM:  - On Call Team (Please refer to Samina) FROM 5:00 PM - 8:30PM  - Nightfloat Team FROM 8:30 -7:30 AM    CHIEF COMPLAINT & BRIEF HOSPITAL COURSE: 80M from home with daughter PMH bladder cancer mets to lungs, sent 1/29 by oncologist Dr. Oviedo for hypotension, noted in ED to have generalized weakness and new-onset AF, confirmed by daughter Opal ([phone]) to have hemoptysis, asp PNA, and increasing confusion recently, DNR/DNI, modified comfort care with medical mgmt such as IVF, abx, imaging, but avoidance of aggressive measures (eg AC). Hospice discussed, but daughter endorses having enough support at home for now. Noted to have HR 120s and SBP 90s in ED. Labs notable for Hb 8.6, Cr 1.36, trop negative x1. EKG with AF RVR. Admitted to tele for management of hypotension and new-onset AF.     INTERVAL HPI/OVERNIGHT EVENTS: NAEON. T max 99.1, VS wnl. This am noted to have episode of hemoptysis productive of large clot, concerning for PE vs worsening lung ca. D-dimer 497. Will obtain CTA today and start AC if +PE, daughter agreeable. Glycopyrrolate and suctioning added. Hb 7.3, stable, continuing to monitor. Day 4 amio, HR remains improved, cards Dr. Patel following, will continue with maintenance dose from tomorrow. UCx with enterococcus, abx changed to Unasyn per Dr. Gomez.     MEDICATIONS  (STANDING):  aMIOdarone    Tablet   Oral   aMIOdarone    Tablet 400 milliGRAM(s) Oral every 8 hours  ampicillin/sulbactam  IVPB 1.5 Gram(s) IV Intermittent every 8 hours  aspirin  chewable 81 milliGRAM(s) Oral daily  cyanocobalamin 1000 MICROGram(s) Oral daily  dexAMETHasone  Injectable 6 milliGRAM(s) IV Push daily  enoxaparin Injectable 40 milliGRAM(s) SubCutaneous daily  metoprolol tartrate 50 milliGRAM(s) Oral two times a day  sodium chloride 1 Gram(s) Oral daily    MEDICATIONS  (PRN):  acetaminophen   Tablet .. 650 milliGRAM(s) Oral every 6 hours PRN Temp greater or equal to 38C (100.4F), Mild Pain (1 - 3)  ALBUTerol    90 MICROgram(s) HFA Inhaler 2 Puff(s) Inhalation every 6 hours PRN Shortness of Breath  glycopyrrolate 1 milliGRAM(s) Oral every 6 hours PRN secretions  guaiFENesin   Syrup  (Sugar-Free) 100 milliGRAM(s) Oral every 6 hours PRN Cough  morphine  - Injectable 2 milliGRAM(s) IV Push every 4 hours PRN Severe Pain (7 - 10)    REVIEW OF SYSTEMS:  CONSTITUTIONAL: No fever or fatigue  RESPIRATORY: cough, no shortness of breath  CARDIOVASCULAR: resolved chest pain, no palpitations, dizziness, or leg swelling  GASTROINTESTINAL: No abdominal pain. No nausea, vomiting; No diarrhea or constipation.   GENITOURINARY: No dysuria or hematuria, urinary frequency  NEUROLOGICAL: No headache  SKIN: No itching or rashes  PSYCH: no issues  MSK: no arthralgias or myalgias  all other systems reviewed and are negative     Vital Signs Last 24 Hrs  T(C): 36.5 (05 Feb 2021 11:18), Max: 37.3 (04 Feb 2021 16:22)  T(F): 97.7 (05 Feb 2021 11:18), Max: 99.1 (04 Feb 2021 16:22)  HR: 67 (05 Feb 2021 11:18) (65 - 76)  BP: 138/88 (05 Feb 2021 11:18) (110/63 - 142/76)  BP(mean): --  RR: 19 (05 Feb 2021 11:18) (18 - 19)  SpO2: 94% (05 Feb 2021 11:18) (94% - 98%)    PHYSICAL EXAMINATION:  GENERAL: thin, lying in bed  HEAD:  Atraumatic, Normocephalic  EYES:  conjunctiva and sclera clear  NECK: Supple  CHEST/LUNG: deep breaths, coarse, "junky" breath sounds even discernible to ear, hemoptysis noted  HEART: RRR  ABDOMEN: Soft, Nontender, Nondistended; Bowel sounds present  NERVOUS SYSTEM: alert and oriented x2, lethargic  EXTREMITIES:  2+ Peripheral Pulses, No edema  SKIN: warm dry  HEME: no unusual bruising noted  PSYCH: appropriate mood and affect, improved lethargy                            7.3    6.22  )-----------( 218      ( 05 Feb 2021 06:48 )             25.2     02-05    143  |  109<H>  |  30<H>  ----------------------------<  93  3.8   |  26  |  1.38<H>    Ca    8.6      05 Feb 2021 06:48  Phos  3.5     02-05  Mg     2.2     02-05    TPro  6.4  /  Alb  1.7<L>  /  TBili  0.5  /  DBili  x   /  AST  15  /  ALT  12  /  AlkPhos  115  02-05    LIVER FUNCTIONS - ( 05 Feb 2021 06:48 )  Alb: 1.7 g/dL / Pro: 6.4 g/dL / ALK PHOS: 115 U/L / ALT: 12 U/L DA / AST: 15 U/L / GGT: x                   CAPILLARY BLOOD GLUCOSE      RADIOLOGY & ADDITIONAL TESTS:

## 2021-02-05 NOTE — PROGRESS NOTE ADULT - ATTENDING COMMENTS
anticipate dc home with home PT in AM
I have examined the patient at bedside and reviewed patient's data and participated in the management of the patient along with Margie MCLAIN as well as hemotology/med oncology faculty consisting of Dr. Raj Garcia, Dr. DOMINIC Brooks, Dr. DOMINIC Rider, Dr. Vinod Ashley, Dr. Analy Robison, Dr. Richard Chacon as well as myself during the daily heme/onc case review. I reviewed pertinent clinical information, PE,  labs as well as A/P as outline above.     GOC is palliative; ECOG PS 4; I attempted to contact the daughter Opal and grand-daughter Angelic multiple times w/o success to discuss home vs inpt hospiece; will continue to contact them
I reviewed patient's data and participated in the management of the patient along with Margie MCLAIN as well as hematology/med oncology faculty during the daily heme/onc case review. I reviewed pertinent clinical information, PE,  labs as well as A/P as outline above, in agreement and edited as appropriate.
Patient seen and examined. C/o non-reproducible CP this AM, EKG with inverted t-wave in V1 (hard to compare from prior EKG as was in rapid a-fib at the time), but troponin neg x 2 and CP resolved. Telemetry reviewed, continues to go into rapid a-fib, up to 180's last night and also with significant ectopy. Plan to increase BB to metoprolol 50mg po BID and monitor. Team spoke with daughter who is amenable to Asa 81mg for AC for a-fib. Will need to monitor H/H carefully given hx of bleeding. Remaining care as above.
I have personally seen, examined, and participated in the care of this patient this morning. I have reviewed all pertinent clinical information, including history, physical exam, plan and medical student/resident/PA/NP note, and agree, with my independent findings, alterations, and conclusions as noted:     Reviewed vitals, labs personally    Spoke with pt - reports abdominal pain. No CP. Telemetry reviewed - rapid afib to 170s with ectopy.    #Rapid afib  -hold off on more metoprolol increase given soft BP - will start amio load  -cards  -ASA81, but family does not want AC for Afib    #Bladder ca  -reviewed palliative note and spoke with Dr. Mcneal - daughter is still struggling with decision re: hospice - will consult with other family members  -continue GOC conversations    D/w Dr. Mclean
Patient seen and examined. Agree with Dr. Mclean's note as above. Plan was for dc home with home PT today but then as I was reviewing tele monitor, went back into a-fib with RVR to 140's. Subsequently converted back to NSR. Will increase metoprolol to 25mg po BID and continue tele monitoring. No AC due to hx of bleeding from cancer.
I have personally seen, examined, and participated in the care of this patient this morning. I have reviewed all pertinent clinical information, including history, physical exam, plan and medical student/resident/PA/NP note, and agree, with my independent findings, alterations, and conclusions as noted:     Reviewed vitals, labs personally    Spoke with pt - denies ab pain, CP. Had fever overnight    #Fevers  -white count increased, lactate 1.5 overnight  -COVID +, UA had WBC - will treat with vanc/zosyn  -currently not requiring O2, but will monitor closely, supportive measures for COVID    #Rapid afib  -c/w metoprolol, amio, HR improved  -cards consult if no improvement  -ASA81, but family does not want AC for Afib    #Bladder ca  -reviewed palliative note  daughter is still struggling with decision re: hospice  -continue [facility] conversations    D/w Dr. Mclean
I have personally seen, examined, and participated in the care of this patient this morning. I have reviewed all pertinent clinical information, including history, physical exam, plan and medical student/resident/PA/NP note, and agree, with my independent findings, alterations, and conclusions as noted:     Reviewed vitals, labs personally    On physical exam:  Gen: NAD thin [place]  male  Chest/CV: RRR, +2 peripheral pulses  Pulm: slightly short of breath  Abd: soft, NT, ND +BS  MSK: no peripheral edema/cyanosis     #Fevers - may from UTI vs. COVID - growing E. faecalis  -resp support for COVID, inflammatory markers - not yet requiring O2, but low threshold for O2, decadron  -c/w antibiotics for UTI - unasyn per ID  -f/u CX    #Hemoptysis  -may be PE given malignancy, SOB, COVID vs. lung mets  -CTA - spoke with daughter Opal today - would want anticoagulation, to treat poss PE    #Rapid afib  -c/w metoprolol, amio, HR improved  -cards on board, appreciate recs  -ASA81    #Bladder ca  -reviewed palliative note  daughter does not wish palliative care now  -continue GOC conversations    D/w Dr. Mclean
I have personally seen, examined, and participated in the care of this patient this morning. I have reviewed all pertinent clinical information, including history, physical exam, plan and medical student/resident/PA/NP note, and agree, with my independent findings, alterations, and conclusions as noted:     Reviewed vitals, labs personally    On physical exam:  Gen: NAD thin [place]  male  Chest/CV: RRR, +2 peripheral pulses  Pulm: slightly short of breath  Abd: soft, NT, ND +BS  MSK: no peripheral edema/cyanosis   Spoke with pt - feeling better, although now has cough, coughing up sputum.    #Fevers - may from UTI vs. COVID  -resp support for COVID, inflammatory markers - not yet requiring O2, but low threshold for O2, decadron  -c/w antibiotics for UTI - unasyn per ID  -f/u CX    #Rapid afib  -c/w metoprolol, amio, HR improved  -cards on board, appreciate recs  -ASA81, but family does not want AC for Afib    #Bladder ca  -reviewed palliative note  daughter is still struggling with decision re: hospice  -continue GOC conversations    D/w Dr. Mclean

## 2021-02-05 NOTE — PROGRESS NOTE ADULT - PROBLEM SELECTOR PLAN 4
Likely from poor po intake, now improved s/p IVF  -Holding IVF since 1/30 and will monitor BP  -Encourage po intake
Likely from poor po intake, now improved s/p IVF  -Holding IVF today and will monitor BP  -Encourage po intake
H/H dropped from 8.6 to 7.7. No s/s bleeding. Normocytic  -today 7.3, observe  -Trend CBC daily  -Maintain active T/S (most recent 2/1)
2/2pm newly febrile to 100.6F  -UA+, COVID+ (see above section for further details)  -CXR negative for new infection, notable for known mets  -BCx NGTD, UCx E faecalis  -started empiric vanc and Zosyn 2/3, changed to Unasyn 2/4  -ID Dr. Gomez consulted
Likely from poor po intake, now improved s/p IVF  -Holding IVF today and will monitor BP  -Encourage po intake
Likely from poor po intake, now improved s/p IVF  -Holding IVF since 1/30 and will monitor BP  -Encourage po intake
2/2pm newly febrile to 100.6F  -UA+, COVID+ (see above section for further details)  -CXR negative for new infection, notable for known mets  -fu BCx, UCx  -started empiric vanc and Zosyn 2/3  -ID Dr. Gomez consulted

## 2021-02-05 NOTE — PROGRESS NOTE ADULT - PROBLEM SELECTOR PROBLEM 6
Need for prophylactic measure
Hypotension due to hypovolemia
Need for prophylactic measure
ELENA (acute kidney injury)
Hypotension due to hypovolemia

## 2021-02-05 NOTE — PROGRESS NOTE ADULT - PROBLEM SELECTOR PROBLEM 4
Hypotension due to hypovolemia
Anemia
Fever of unknown origin
Hypotension due to hypovolemia
Fever of unknown origin

## 2021-02-05 NOTE — PROGRESS NOTE ADULT - PROBLEM SELECTOR PLAN 7
Likely pre-renal from dehydration, most recent Cr again wnl  -gave x2 IVF boluses 2/3 for Cr 1.46  -follow BMP daily  -Encourage oral intake
Likely pre-renal from dehydration, most recent Cr again mildly elevated to 1.36  -gave x2 IVF boluses 2/3 for Cr 1.46 and x1 500cc bolus 2/5 slowly   -follow BMP daily  -Encourage oral intake
-Lovenox

## 2021-02-05 NOTE — PROGRESS NOTE ADULT - PROBLEM SELECTOR PLAN 6
-Lovenox
Likely from poor po intake, now improved s/p IVF  -Encourage po intake
Likely from poor po intake, now improved s/p IVF  -Encourage po intake
-Lovenox
-Lovenox
Likely pre-renal from dehydration, most recent Cr again worsened 1.46  -gave x2 IVF boluses  -follow BMP this pm  -Encourage oral intake
-Lovenox

## 2021-02-05 NOTE — PROGRESS NOTE ADULT - SUBJECTIVE AND OBJECTIVE BOX
Patient denies chest pain breathing comfortable.   Review of systems otherwise (-)  	  MEDICATIONS:  MEDICATIONS  (STANDING):  aMIOdarone    Tablet   Oral   aMIOdarone    Tablet 400 milliGRAM(s) Oral every 8 hours  ampicillin/sulbactam  IVPB 1.5 Gram(s) IV Intermittent every 8 hours  aspirin  chewable 81 milliGRAM(s) Oral daily  cyanocobalamin 1000 MICROGram(s) Oral daily  dexAMETHasone  Injectable 6 milliGRAM(s) IV Push daily  enoxaparin Injectable 40 milliGRAM(s) SubCutaneous daily  metoprolol tartrate 50 milliGRAM(s) Oral two times a day  sodium chloride 1 Gram(s) Oral daily      LABS:	 	    CARDIAC MARKERS:                                7.3    6.22  )-----------( 218      ( 05 Feb 2021 06:48 )             25.2     Hemoglobin: 7.3 g/dL (02-05 @ 06:48)  Hemoglobin: 7.2 g/dL (02-04 @ 07:13)  Hemoglobin: 7.3 g/dL (02-03 @ 08:37)  Hemoglobin: 8.2 g/dL (02-02 @ 06:22)  Hemoglobin: 8.0 g/dL (02-01 @ 06:35)      02-05    143  |  109<H>  |  30<H>  ----------------------------<  93  3.8   |  26  |  1.38<H>    Ca    8.6      05 Feb 2021 06:48  Phos  3.5     02-05  Mg     2.2     02-05    TPro  6.4  /  Alb  1.7<L>  /  TBili  0.5  /  DBili  x   /  AST  15  /  ALT  12  /  AlkPhos  115  02-05    Creatinine Trend: 1.38<--, 1.20<--, 1.43<--, 0.95<--, 0.99<--, 0.96<--      PHYSICAL EXAM:  T(C): 37.3 (02-05-21 @ 07:23), Max: 37.3 (02-04-21 @ 16:22)  HR: 76 (02-05-21 @ 07:23) (65 - 76)  BP: 142/76 (02-05-21 @ 07:23) (110/63 - 142/76)  RR: 19 (02-05-21 @ 07:23) (18 - 19)  SpO2: 94% (02-05-21 @ 07:23) (94% - 98%)  Wt(kg): --  I&O's Summary    HEENT:  (-)icterus (-)pallor  CV: N S1 S2 1/6 PALMA (+)2 Pulses B/l  Resp:  Clear to ausculatation B/L, normal effort  GI: (+) BS Soft, NT, ND  Lymph:  (-)Edema, (-)obvious lymphadenopathy  Skin: Warm to touch, Normal turgor  Psych: Appropriate mood and affect      TELEMETRY: 	Sinus APC          ASSESSMENT/PLAN: 	80y  Male metastatic Bladder ca on home hospice admitted with new PAF already spontaneously converted.    - agree with amio load.  Cont 400 TID load to 5 grams  - echo with normal LA size, normal LV fx  - family decline use of blood thinners.  - tx covid per medical team  - supportive care  - palliative f/u    Telly Patel MD, [facility]  BEEPER [phone]

## 2021-02-05 NOTE — PROGRESS NOTE ADULT - PROBLEM SELECTOR PLAN 3
-febrile 100.6F 2/2, septic workup sent, +COVID 2/3  -CXR unremarkable for infx, not requiring O2 at this time  -started Decadron empirically 2/4, hold off remdesivir given variable Cr and lack of O2 requirement  -trend APRs, d-dimer 497 today  -episode hemoptysis 2/5, concerning for PE vs worsening met ca in lungs, fu CTA, fibrinogen, coags  -daughter agreeable for AC if +PE  -supportive care with prn Tylenol, albuterol, robitussin, and incentive spirometry as tolerated  -isolation for x10 days from 2/3, daughter informed 2/4am by team attending  -attending to be point person for family going forward given complexity of diagnosis and concern of family

## 2021-02-05 NOTE — PROGRESS NOTE ADULT - PROBLEM SELECTOR PLAN 2
Patient no longer a candidate for further treatment due to functional status. Initial plans for home hospice but daughter is now no longer interested in hospice  -Patient to follow-up with Dr. Oviedo for further discussions regarding goals of care as outpatient  -palliative consulted 2/2, daughter does not want to discuss hospice but says she will discuss it with her family
Patient no longer a candidate for further treatment due to functional status. Initial plans for home hospice but daughter is now no longer interested in hospice  -Patient to follow-up with Dr. Oviedo for further discussions regarding goals of care as outpatient
Patient no longer a candidate for further treatment due to functional status. Initial plans for home hospice but daughter is now no longer interested in hospice  -Patient to follow-up with Dr. Oviedo for further discussions regarding goals of care as outpatient
-not a candidate for further palliative chemotherapy at this time  -pt is an appropriate candidate for home hospice, await family decision  -family leaning towards home hospice, decision on hold with current COVID diagnosis  -Patient to follow-up with Dr. Oviedo for further discussions regarding goals of care as outpatient  -palliative and heme/onc following, daughter does not want to discuss hospice but says she will discuss it with her family
-not a candidate for further palliative chemotherapy at this time  -pt is an appropriate candidate for home hospice, await family decision  -Patient to follow-up with Dr. Oviedo for further discussions regarding goals of care as outpatient  -palliative and heme/onc following, daughter does not want to discuss hospice but says she will discuss it with her family
Patient no longer a candidate for further treatment due to functional status. Initial plans for home hospice but daughter is now no longer interested in hospice  -Patient to follow-up with Dr. Oviedo for further discussions regarding goals of care as outpatient
Patient no longer a candidate for further treatment due to functional status. Initial plans for home hospice but daughter is now no longer interested in hospice  -Patient to follow-up with Dr. Oviedo for further discussions regarding goals of care as outpatient  -palliative consulted 2/2, daughter does not want to discuss hospice but says she will discuss it with her family

## 2021-02-05 NOTE — PROGRESS NOTE ADULT - PROBLEM SELECTOR PROBLEM 2
Malignant neoplasm of urinary bladder, unspecified site

## 2021-02-05 NOTE — PROGRESS NOTE ADULT - PROVIDER SPECIALTY LIST ADULT
Heme/Onc
Cardiology
Internal Medicine
Internal Medicine
Heme/Onc
Internal Medicine
Hospitalist

## 2021-02-05 NOTE — PROGRESS NOTE ADULT - PROBLEM SELECTOR PLAN 5
H/H 8.6>7.2 this adm to date, no evidence of bleeding, normocytic, likely multifactorial  -Trend CBC daily, transfuse if <7  -Maintain active T/S (most recent 2/1, will resend today)
Likely pre-renal from dehydration, most recent Cr wnl  -follow BMP this am  -Encourage oral intake
Likely from poor po intake, now improved s/p IVF  -Encourage po intake
Likely pre-renal from dehydration, most recent Cr wnl  -follow BMP this am  -Encourage oral intake
H/H 8.6>7.2 this adm to date, no evidence of bleeding, normocytic, likely multifactorial  -Trend CBC daily, transfuse if <7  -Maintain active T/S (most recent 2/4)
Likely pre-renal from dehydration  -Repeat BMP in AM  -Encourage oral intake
Likely pre-renal from dehydration, most recent Cr wnl  -follow BMP this am  -Encourage oral intake

## 2021-02-05 NOTE — DISCHARGE NOTE FOR THE EXPIRED PATIENT - HOSPITAL COURSE
80M from home with sree [facility] bladder cancer mets to lungs, sent 1/29 by oncologist Dr. Oivedo for hypotension, noted in ED to have generalized weakness and new-onset AF, confirmed by daughter Opal ([phone]) to have hemoptysis, asp PNA, and increasing confusion recently, DNR/DNI, modified comfort care with medical mgmt such as IVF, abx, imaging, but avoidance of aggressive measures (eg AC). Hospice discussed, but daughter endorses having enough support at home for now. Noted to have HR 120s and SBP 90s in ED. Labs notable for Hb 8.6, Cr 1.36, trop negative x1. EKG with AF RVR. Admitted to tele for management of hypotension and new-onset AF.     Patient Continued to have episodes AF with RVR this admission despite increase in metoprolol to 50mg BID, will continue to monitor today. Daughter Opal informed and agreeable. Pt is Hospice eligible but family undecided. Patients blood cultures were negative Ucx were growing vanc resistant  enterococcus. Patient was made DNR/ DNI inpatient.     Code rapid was called at 10: 54 pm nurse about patient being unresponsive and in asystole. Patient seen at bedside. On examination, patient was unresponsive, bilateral pupils dilated, non-responsive to light, no bilateral conjunctival reflex present, no heart sounds auscultated, no spontaneous breath sounds present, no peripheral pulses present. EKG showed asystole. Patient pronounced dead at 10:54 pm 2/5/2021. Attending, Dr. Zhao informed regarding patient’s status. No family present at bedside. Dr. Caldwell informed patient’s HCP Opal Abrams and condolences were offered.”   80M from home with daughter PMH bladder cancer mets to lungs, sent 1/29 by oncologist Dr. Oviedo for hypotension, noted in ED to have generalized weakness and new-onset AF, confirmed by daughter Opal ([phone]) to have hemoptysis, asp PNA, and increasing confusion recently, DNR/DNI, modified comfort care with medical mgmt such as IVF, abx, imaging, but avoidance of aggressive measures (eg AC). Hospice discussed, but daughter endorses having enough support at home for now. Noted to have HR 120s and SBP 90s in ED. Labs notable for Hb 8.6, Cr 1.36, trop negative x1. EKG with AF RVR. Admitted to tele for management of hypotension and new-onset AF.     Patient Continued to have episodes AF with RVR this admission despite increase in metoprolol to 50mg BID, will continue to monitor today. Daughter Opal informed and agreeable. Pt is Hospice eligible but family undecided. Patients blood cultures were negative Ucx were growing vanc resistant  enterococcus. Patient was made DNR/ DNI inpatient.     Code rapid was called at 10: 54 pm nurse about patient being unresponsive and in asystole. Patient seen at bedside. On examination, patient was unresponsive, bilateral pupils dilated, non-responsive to light, no bilateral conjunctival reflex present, no heart sounds auscultated, no spontaneous breath sounds present, no peripheral pulses present. EKG showed asystole. Patient pronounced dead at 10:54 pm 2/5/2021. Attending, Dr. Zhao informed regarding patient’s status. No family present at bedside. Dr. Caldwell informed patient’s HCP Opal Abrams and condolences were offered.”   80M from home with daughter PMH bladder cancer mets to lungs, sent 1/29 by oncologist Dr. Oviedo for hypotension, noted in ED to have generalized weakness and new-onset AF, confirmed by daughter Opal ([phone]) to have hemoptysis, asp PNA, and increasing confusion recently, He was DNR/DNI, but on modified comfort care with medical mgmt such as IVF, abx, imaging, but avoidance of aggressive measures. Hospice discussed, but daughter endorsed having enough support at home for now. Noted to have HR 120s and SBP 90s in ED. Labs notable for Hb 8.6, Cr 1.36, trop negative x1. EKG with AF RVR. Admitted to tele for management of hypotension and new-onset AF, with cards following.     Patient continued to have episodes AF with RVR despite increase in metoprolol to 50mg BID. Pt and family continued to decline hospice throughout hospitalization. Patient developed a fever, tested positive for COVID, and UA was positive. His blood cultures were negative but Ucx were growing vanc resistant  enterococcus; ID was on board and pt placed on unasyn. Patient was made DNR/ DNI inpatient. After discussion with family, the daughters requested pt remain in hospital while quarantining for COVID.    Code rapid was called at 10:54 pm from the nurse as the patient was unresponsive and in asystole. Patient seen at bedside. On examination, patient was unresponsive, bilateral pupils dilated, non-responsive to light, no bilateral conjunctival reflex present, no heart sounds auscultated, no spontaneous breath sounds present, no peripheral pulses present. EKG showed asystole. Patient pronounced dead at 10:54 pm 2/5/2021. Attending, Dr. Zhao informed regarding patient’s status. No family present at bedside. Dr. Caldwell informed patient’s HCP Opal Abrams and condolences were offered.”

## 2021-02-05 NOTE — PROGRESS NOTE ADULT - PROBLEM SELECTOR PROBLEM 3
Anemia
COVID-19 virus infection
Anemia
Fever of unknown origin
COVID-19 virus infection

## 2021-02-08 LAB
CULTURE RESULTS: SIGNIFICANT CHANGE UP
CULTURE RESULTS: SIGNIFICANT CHANGE UP
SPECIMEN SOURCE: SIGNIFICANT CHANGE UP
SPECIMEN SOURCE: SIGNIFICANT CHANGE UP

## 2021-03-16 PROCEDURE — 86850 RBC ANTIBODY SCREEN: CPT

## 2021-03-16 PROCEDURE — 93306 TTE W/DOPPLER COMPLETE: CPT

## 2021-03-16 PROCEDURE — 97162 PT EVAL MOD COMPLEX 30 MIN: CPT

## 2021-03-16 PROCEDURE — 82728 ASSAY OF FERRITIN: CPT

## 2021-03-16 PROCEDURE — 84145 PROCALCITONIN (PCT): CPT

## 2021-03-16 PROCEDURE — 86901 BLOOD TYPING SEROLOGIC RH(D): CPT

## 2021-03-16 PROCEDURE — 81001 URINALYSIS AUTO W/SCOPE: CPT

## 2021-03-16 PROCEDURE — 87086 URINE CULTURE/COLONY COUNT: CPT

## 2021-03-16 PROCEDURE — 80053 COMPREHEN METABOLIC PANEL: CPT

## 2021-03-16 PROCEDURE — 97530 THERAPEUTIC ACTIVITIES: CPT

## 2021-03-16 PROCEDURE — 0225U NFCT DS DNA&RNA 21 SARSCOV2: CPT

## 2021-03-16 PROCEDURE — 83615 LACTATE (LD) (LDH) ENZYME: CPT

## 2021-03-16 PROCEDURE — 85730 THROMBOPLASTIN TIME PARTIAL: CPT

## 2021-03-16 PROCEDURE — 83605 ASSAY OF LACTIC ACID: CPT

## 2021-03-16 PROCEDURE — 87635 SARS-COV-2 COVID-19 AMP PRB: CPT

## 2021-03-16 PROCEDURE — 85379 FIBRIN DEGRADATION QUANT: CPT

## 2021-03-16 PROCEDURE — 93005 ELECTROCARDIOGRAM TRACING: CPT

## 2021-03-16 PROCEDURE — 84100 ASSAY OF PHOSPHORUS: CPT

## 2021-03-16 PROCEDURE — 87040 BLOOD CULTURE FOR BACTERIA: CPT

## 2021-03-16 PROCEDURE — 36415 COLL VENOUS BLD VENIPUNCTURE: CPT

## 2021-03-16 PROCEDURE — 87186 SC STD MICRODIL/AGAR DIL: CPT

## 2021-03-16 PROCEDURE — 82962 GLUCOSE BLOOD TEST: CPT

## 2021-03-16 PROCEDURE — 85027 COMPLETE CBC AUTOMATED: CPT

## 2021-03-16 PROCEDURE — 84484 ASSAY OF TROPONIN QUANT: CPT

## 2021-03-16 PROCEDURE — 86900 BLOOD TYPING SEROLOGIC ABO: CPT

## 2021-03-16 PROCEDURE — 97110 THERAPEUTIC EXERCISES: CPT

## 2021-03-16 PROCEDURE — 83735 ASSAY OF MAGNESIUM: CPT

## 2021-03-16 PROCEDURE — U0005: CPT

## 2021-03-16 PROCEDURE — 99285 EMERGENCY DEPT VISIT HI MDM: CPT

## 2021-03-16 PROCEDURE — 85384 FIBRINOGEN ACTIVITY: CPT

## 2021-03-16 PROCEDURE — 85025 COMPLETE CBC W/AUTO DIFF WBC: CPT

## 2021-03-16 PROCEDURE — 80202 ASSAY OF VANCOMYCIN: CPT

## 2021-03-16 PROCEDURE — 85610 PROTHROMBIN TIME: CPT

## 2021-03-16 PROCEDURE — 71045 X-RAY EXAM CHEST 1 VIEW: CPT
